# Patient Record
Sex: MALE | Race: WHITE | NOT HISPANIC OR LATINO | URBAN - METROPOLITAN AREA
[De-identification: names, ages, dates, MRNs, and addresses within clinical notes are randomized per-mention and may not be internally consistent; named-entity substitution may affect disease eponyms.]

---

## 2017-07-09 ENCOUNTER — SURGERY - HEALTHEAST (OUTPATIENT)
Dept: CARDIOLOGY | Facility: CLINIC | Age: 77
End: 2017-07-09

## 2017-07-09 ENCOUNTER — RECORDS - HEALTHEAST (OUTPATIENT)
Dept: ADMINISTRATIVE | Facility: OTHER | Age: 77
End: 2017-07-09

## 2017-07-10 ENCOUNTER — RECORDS - HEALTHEAST (OUTPATIENT)
Dept: ADMINISTRATIVE | Facility: OTHER | Age: 77
End: 2017-07-10

## 2017-07-10 ASSESSMENT — MIFFLIN-ST. JEOR
SCORE: 1758.5
SCORE: 1755.5
SCORE: 1758.5
SCORE: 1755.5

## 2017-07-11 ENCOUNTER — RECORDS - HEALTHEAST (OUTPATIENT)
Dept: ADMINISTRATIVE | Facility: OTHER | Age: 77
End: 2017-07-11

## 2017-07-11 ENCOUNTER — SURGERY - HEALTHEAST (OUTPATIENT)
Dept: CARDIOLOGY | Facility: CLINIC | Age: 77
End: 2017-07-11

## 2017-07-11 ASSESSMENT — MIFFLIN-ST. JEOR
SCORE: 1715.99
SCORE: 1715.99

## 2017-07-12 ENCOUNTER — ANESTHESIA - HEALTHEAST (OUTPATIENT)
Dept: CARDIOLOGY | Facility: CLINIC | Age: 77
End: 2017-07-12

## 2017-07-12 ENCOUNTER — RECORDS - HEALTHEAST (OUTPATIENT)
Dept: ADMINISTRATIVE | Facility: OTHER | Age: 77
End: 2017-07-12

## 2017-07-12 ASSESSMENT — MIFFLIN-ST. JEOR
SCORE: 1723.25

## 2017-07-13 ENCOUNTER — RECORDS - HEALTHEAST (OUTPATIENT)
Dept: ADMINISTRATIVE | Facility: OTHER | Age: 77
End: 2017-07-13

## 2017-07-13 ENCOUNTER — SURGERY - HEALTHEAST (OUTPATIENT)
Dept: CARDIOLOGY | Facility: CLINIC | Age: 77
End: 2017-07-13

## 2017-07-13 ASSESSMENT — MIFFLIN-ST. JEOR
SCORE: 1684.24
SCORE: 1684.24

## 2017-07-14 ENCOUNTER — RECORDS - HEALTHEAST (OUTPATIENT)
Dept: ADMINISTRATIVE | Facility: OTHER | Age: 77
End: 2017-07-14

## 2017-07-14 ASSESSMENT — MIFFLIN-ST. JEOR
SCORE: 1743.5
SCORE: 1703.5
SCORE: 1743.5
SCORE: 1743.5
SCORE: 1703.5
SCORE: 1743.5

## 2017-07-15 ENCOUNTER — RECORDS - HEALTHEAST (OUTPATIENT)
Dept: ADMINISTRATIVE | Facility: OTHER | Age: 77
End: 2017-07-15

## 2017-07-15 ASSESSMENT — MIFFLIN-ST. JEOR
SCORE: 1703.5
SCORE: 1703.5

## 2017-07-16 ENCOUNTER — RECORDS - HEALTHEAST (OUTPATIENT)
Dept: ADMINISTRATIVE | Facility: OTHER | Age: 77
End: 2017-07-16

## 2017-07-16 ASSESSMENT — MIFFLIN-ST. JEOR
SCORE: 1688.77
SCORE: 1688.77

## 2017-07-17 ENCOUNTER — AMBULATORY - HEALTHEAST (OUTPATIENT)
Dept: GERIATRICS | Facility: CLINIC | Age: 77
End: 2017-07-17

## 2017-07-18 ENCOUNTER — OFFICE VISIT - HEALTHEAST (OUTPATIENT)
Dept: GERIATRICS | Facility: CLINIC | Age: 77
End: 2017-07-18

## 2017-07-18 DIAGNOSIS — S81.809A OPEN WOUNDS INVOLVING MULTIPLE REGIONS OF LOWER EXTREMITY: ICD-10-CM

## 2017-07-18 DIAGNOSIS — I49.5 SINUS NODE DYSFUNCTION (H): ICD-10-CM

## 2017-07-18 DIAGNOSIS — I48.20 CHRONIC ATRIAL FIBRILLATION (H): ICD-10-CM

## 2017-07-18 DIAGNOSIS — R00.1 BRADYCARDIA: ICD-10-CM

## 2017-07-18 DIAGNOSIS — N18.6 ESRD (END STAGE RENAL DISEASE) (H): ICD-10-CM

## 2017-07-20 ENCOUNTER — OFFICE VISIT - HEALTHEAST (OUTPATIENT)
Dept: GERIATRICS | Facility: CLINIC | Age: 77
End: 2017-07-20

## 2017-07-20 DIAGNOSIS — S81.809A OPEN WOUNDS INVOLVING MULTIPLE REGIONS OF LOWER EXTREMITY: ICD-10-CM

## 2017-07-20 DIAGNOSIS — I48.20 CHRONIC ATRIAL FIBRILLATION (H): ICD-10-CM

## 2017-07-20 DIAGNOSIS — R00.1 BRADYCARDIA: ICD-10-CM

## 2017-07-20 DIAGNOSIS — I49.5 SINUS NODE DYSFUNCTION (H): ICD-10-CM

## 2017-07-20 DIAGNOSIS — N18.6 ESRD (END STAGE RENAL DISEASE) (H): ICD-10-CM

## 2017-07-25 ENCOUNTER — OFFICE VISIT - HEALTHEAST (OUTPATIENT)
Dept: GERIATRICS | Facility: CLINIC | Age: 77
End: 2017-07-25

## 2017-07-25 DIAGNOSIS — R00.1 BRADYCARDIA: ICD-10-CM

## 2017-07-25 DIAGNOSIS — S81.809A OPEN WOUNDS INVOLVING MULTIPLE REGIONS OF LOWER EXTREMITY: ICD-10-CM

## 2017-07-25 DIAGNOSIS — N18.6 ESRD (END STAGE RENAL DISEASE) (H): ICD-10-CM

## 2017-07-25 DIAGNOSIS — I49.5 SINUS NODE DYSFUNCTION (H): ICD-10-CM

## 2017-07-27 ENCOUNTER — OFFICE VISIT - HEALTHEAST (OUTPATIENT)
Dept: GERIATRICS | Facility: CLINIC | Age: 77
End: 2017-07-27

## 2017-07-27 DIAGNOSIS — S81.809A OPEN WOUNDS INVOLVING MULTIPLE REGIONS OF LOWER EXTREMITY: ICD-10-CM

## 2017-07-27 DIAGNOSIS — N18.6 ESRD (END STAGE RENAL DISEASE) (H): ICD-10-CM

## 2017-07-27 DIAGNOSIS — I49.5 SINUS NODE DYSFUNCTION (H): ICD-10-CM

## 2017-07-27 DIAGNOSIS — R00.1 BRADYCARDIA: ICD-10-CM

## 2017-08-01 ENCOUNTER — OFFICE VISIT - HEALTHEAST (OUTPATIENT)
Dept: GERIATRICS | Facility: CLINIC | Age: 77
End: 2017-08-01

## 2017-08-01 DIAGNOSIS — I10 ESSENTIAL HYPERTENSION WITH GOAL BLOOD PRESSURE LESS THAN 140/90: ICD-10-CM

## 2017-08-01 DIAGNOSIS — E78.5 HYPERLIPIDEMIA, UNSPECIFIED HYPERLIPIDEMIA TYPE: ICD-10-CM

## 2017-08-01 DIAGNOSIS — N04.9 NEPHROTIC SYNDROME: ICD-10-CM

## 2017-08-01 DIAGNOSIS — S81.809A OPEN WOUNDS INVOLVING MULTIPLE REGIONS OF LOWER EXTREMITY: ICD-10-CM

## 2017-08-03 ENCOUNTER — OFFICE VISIT - HEALTHEAST (OUTPATIENT)
Dept: GERIATRICS | Facility: CLINIC | Age: 77
End: 2017-08-03

## 2017-08-03 DIAGNOSIS — N18.6 ESRD (END STAGE RENAL DISEASE) (H): ICD-10-CM

## 2017-08-03 DIAGNOSIS — I10 ESSENTIAL HYPERTENSION WITH GOAL BLOOD PRESSURE LESS THAN 140/90: ICD-10-CM

## 2017-08-03 DIAGNOSIS — I48.20 CHRONIC ATRIAL FIBRILLATION (H): ICD-10-CM

## 2017-08-04 ENCOUNTER — OFFICE VISIT - HEALTHEAST (OUTPATIENT)
Dept: GERIATRICS | Facility: CLINIC | Age: 77
End: 2017-08-04

## 2017-08-04 DIAGNOSIS — R00.1 BRADYCARDIA: ICD-10-CM

## 2017-08-04 DIAGNOSIS — N18.6 ESRD (END STAGE RENAL DISEASE) (H): ICD-10-CM

## 2017-08-04 DIAGNOSIS — I10 ESSENTIAL HYPERTENSION WITH GOAL BLOOD PRESSURE LESS THAN 140/90: ICD-10-CM

## 2017-08-04 DIAGNOSIS — S81.809A OPEN WOUNDS INVOLVING MULTIPLE REGIONS OF LOWER EXTREMITY: ICD-10-CM

## 2017-08-09 ENCOUNTER — AMBULATORY - HEALTHEAST (OUTPATIENT)
Dept: GERIATRICS | Facility: CLINIC | Age: 77
End: 2017-08-09

## 2017-08-15 ENCOUNTER — AMBULATORY - HEALTHEAST (OUTPATIENT)
Dept: CARDIOLOGY | Facility: CLINIC | Age: 77
End: 2017-08-15

## 2017-08-15 DIAGNOSIS — Z95.0 CARDIAC PACEMAKER IN SITU: ICD-10-CM

## 2017-08-15 ASSESSMENT — MIFFLIN-ST. JEOR: SCORE: 1709.64

## 2017-08-16 LAB — HCC DEVICE COMMENTS: NORMAL

## 2017-11-30 ENCOUNTER — AMBULATORY - HEALTHEAST (OUTPATIENT)
Dept: CARDIOLOGY | Facility: CLINIC | Age: 77
End: 2017-11-30

## 2017-11-30 DIAGNOSIS — Z95.0 CARDIAC PACEMAKER IN SITU: ICD-10-CM

## 2017-11-30 LAB — HCC DEVICE COMMENTS: NORMAL

## 2017-11-30 ASSESSMENT — MIFFLIN-ST. JEOR: SCORE: 1669.27

## 2018-03-05 ENCOUNTER — AMBULATORY - HEALTHEAST (OUTPATIENT)
Dept: CARDIOLOGY | Facility: CLINIC | Age: 78
End: 2018-03-05

## 2018-03-05 DIAGNOSIS — Z95.0 CARDIAC PACEMAKER IN SITU: ICD-10-CM

## 2018-03-06 LAB — HCC DEVICE COMMENTS: NORMAL

## 2018-08-31 ENCOUNTER — RECORDS - HEALTHEAST (OUTPATIENT)
Dept: LAB | Facility: CLINIC | Age: 78
End: 2018-08-31

## 2018-08-31 LAB — INR PPP: 3.04 (ref 0.9–1.1)

## 2018-09-07 ENCOUNTER — RECORDS - HEALTHEAST (OUTPATIENT)
Dept: LAB | Facility: CLINIC | Age: 78
End: 2018-09-07

## 2018-09-07 LAB — INR PPP: 2.07 (ref 0.9–1.1)

## 2018-09-17 ENCOUNTER — RECORDS - HEALTHEAST (OUTPATIENT)
Dept: LAB | Facility: CLINIC | Age: 78
End: 2018-09-17

## 2018-09-17 LAB — INR PPP: 2.45 (ref 0.9–1.1)

## 2018-09-24 ENCOUNTER — RECORDS - HEALTHEAST (OUTPATIENT)
Dept: LAB | Facility: CLINIC | Age: 78
End: 2018-09-24

## 2018-09-24 LAB — INR PPP: 2.68 (ref 0.9–1.1)

## 2018-10-08 ENCOUNTER — RECORDS - HEALTHEAST (OUTPATIENT)
Dept: LAB | Facility: CLINIC | Age: 78
End: 2018-10-08

## 2018-10-08 LAB — INR PPP: 2.44 (ref 0.9–1.1)

## 2018-10-15 ENCOUNTER — RECORDS - HEALTHEAST (OUTPATIENT)
Dept: LAB | Facility: CLINIC | Age: 78
End: 2018-10-15

## 2018-10-15 LAB — INR PPP: 2.49 (ref 0.9–1.1)

## 2018-10-22 ENCOUNTER — RECORDS - HEALTHEAST (OUTPATIENT)
Dept: LAB | Facility: CLINIC | Age: 78
End: 2018-10-22

## 2018-10-22 LAB — INR PPP: 1.65 (ref 0.9–1.1)

## 2018-10-29 ENCOUNTER — RECORDS - HEALTHEAST (OUTPATIENT)
Dept: LAB | Facility: CLINIC | Age: 78
End: 2018-10-29

## 2018-10-29 LAB — INR PPP: 2.56 (ref 0.9–1.1)

## 2018-11-05 ENCOUNTER — RECORDS - HEALTHEAST (OUTPATIENT)
Dept: LAB | Facility: CLINIC | Age: 78
End: 2018-11-05

## 2018-11-05 LAB — INR PPP: 3.11 (ref 0.9–1.1)

## 2018-11-12 ENCOUNTER — RECORDS - HEALTHEAST (OUTPATIENT)
Dept: LAB | Facility: CLINIC | Age: 78
End: 2018-11-12

## 2018-11-12 LAB — INR PPP: 2.16 (ref 0.9–1.1)

## 2018-11-19 ENCOUNTER — RECORDS - HEALTHEAST (OUTPATIENT)
Dept: LAB | Facility: CLINIC | Age: 78
End: 2018-11-19

## 2018-11-19 LAB — INR PPP: 1.96 (ref 0.9–1.1)

## 2018-11-26 ENCOUNTER — RECORDS - HEALTHEAST (OUTPATIENT)
Dept: LAB | Facility: CLINIC | Age: 78
End: 2018-11-26

## 2018-11-26 LAB — INR PPP: 2.02 (ref 0.9–1.1)

## 2018-12-03 ENCOUNTER — RECORDS - HEALTHEAST (OUTPATIENT)
Dept: LAB | Facility: CLINIC | Age: 78
End: 2018-12-03

## 2018-12-03 LAB — INR PPP: 1.81 (ref 0.9–1.1)

## 2018-12-10 ENCOUNTER — RECORDS - HEALTHEAST (OUTPATIENT)
Dept: LAB | Facility: CLINIC | Age: 78
End: 2018-12-10

## 2018-12-10 LAB — INR PPP: 1.36 (ref 0.9–1.1)

## 2018-12-17 ENCOUNTER — RECORDS - HEALTHEAST (OUTPATIENT)
Dept: LAB | Facility: CLINIC | Age: 78
End: 2018-12-17

## 2018-12-17 LAB — INR PPP: 1.77 (ref 0.9–1.1)

## 2018-12-26 ENCOUNTER — RECORDS - HEALTHEAST (OUTPATIENT)
Dept: LAB | Facility: CLINIC | Age: 78
End: 2018-12-26

## 2018-12-26 LAB — INR PPP: 1.95 (ref 0.9–1.1)

## 2018-12-31 ENCOUNTER — RECORDS - HEALTHEAST (OUTPATIENT)
Dept: LAB | Facility: CLINIC | Age: 78
End: 2018-12-31

## 2018-12-31 LAB — INR PPP: 2.27 (ref 0.9–1.1)

## 2019-01-07 ENCOUNTER — RECORDS - HEALTHEAST (OUTPATIENT)
Dept: LAB | Facility: CLINIC | Age: 79
End: 2019-01-07

## 2019-01-07 LAB — INR PPP: 2.49 (ref 0.9–1.1)

## 2019-01-14 ENCOUNTER — RECORDS - HEALTHEAST (OUTPATIENT)
Dept: LAB | Facility: CLINIC | Age: 79
End: 2019-01-14

## 2019-01-14 LAB — INR PPP: 2.32 (ref 0.9–1.1)

## 2019-01-21 ENCOUNTER — RECORDS - HEALTHEAST (OUTPATIENT)
Dept: LAB | Facility: CLINIC | Age: 79
End: 2019-01-21

## 2019-01-21 LAB — INR PPP: 2.34 (ref 0.9–1.1)

## 2019-01-28 ENCOUNTER — RECORDS - HEALTHEAST (OUTPATIENT)
Dept: LAB | Facility: CLINIC | Age: 79
End: 2019-01-28

## 2019-01-28 LAB — INR PPP: 2.61 (ref 0.9–1.1)

## 2019-02-04 ENCOUNTER — RECORDS - HEALTHEAST (OUTPATIENT)
Dept: LAB | Facility: CLINIC | Age: 79
End: 2019-02-04

## 2019-02-04 LAB — INR PPP: 2.62 (ref 0.9–1.1)

## 2019-02-11 ENCOUNTER — RECORDS - HEALTHEAST (OUTPATIENT)
Dept: LAB | Facility: CLINIC | Age: 79
End: 2019-02-11

## 2019-02-11 LAB — INR PPP: 1.98 (ref 0.9–1.1)

## 2019-02-20 ENCOUNTER — RECORDS - HEALTHEAST (OUTPATIENT)
Dept: LAB | Facility: CLINIC | Age: 79
End: 2019-02-20

## 2019-02-20 LAB — INR PPP: 1.63 (ref 0.9–1.1)

## 2019-02-25 ENCOUNTER — RECORDS - HEALTHEAST (OUTPATIENT)
Dept: LAB | Facility: CLINIC | Age: 79
End: 2019-02-25

## 2019-02-25 LAB — INR PPP: 2 (ref 0.9–1.1)

## 2019-03-04 ENCOUNTER — RECORDS - HEALTHEAST (OUTPATIENT)
Dept: LAB | Facility: CLINIC | Age: 79
End: 2019-03-04

## 2019-03-04 LAB — INR PPP: 1.9 (ref 0.9–1.1)

## 2019-03-13 ENCOUNTER — RECORDS - HEALTHEAST (OUTPATIENT)
Dept: LAB | Facility: CLINIC | Age: 79
End: 2019-03-13

## 2019-03-13 LAB — INR PPP: 3.06 (ref 0.9–1.1)

## 2019-03-18 ENCOUNTER — RECORDS - HEALTHEAST (OUTPATIENT)
Dept: LAB | Facility: CLINIC | Age: 79
End: 2019-03-18

## 2019-03-18 LAB — INR PPP: 2.07 (ref 0.9–1.1)

## 2019-03-25 ENCOUNTER — RECORDS - HEALTHEAST (OUTPATIENT)
Dept: LAB | Facility: CLINIC | Age: 79
End: 2019-03-25

## 2019-03-25 LAB — INR PPP: 2.95 (ref 0.9–1.1)

## 2019-04-01 ENCOUNTER — RECORDS - HEALTHEAST (OUTPATIENT)
Dept: LAB | Facility: CLINIC | Age: 79
End: 2019-04-01

## 2019-04-01 LAB — INR PPP: 2.66 (ref 0.9–1.1)

## 2019-04-08 ENCOUNTER — RECORDS - HEALTHEAST (OUTPATIENT)
Dept: LAB | Facility: CLINIC | Age: 79
End: 2019-04-08

## 2019-04-08 LAB — INR PPP: 2.07 (ref 0.9–1.1)

## 2019-04-15 ENCOUNTER — RECORDS - HEALTHEAST (OUTPATIENT)
Dept: LAB | Facility: CLINIC | Age: 79
End: 2019-04-15

## 2019-04-15 LAB — INR PPP: 3.13 (ref 0.9–1.1)

## 2019-04-22 ENCOUNTER — RECORDS - HEALTHEAST (OUTPATIENT)
Dept: LAB | Facility: CLINIC | Age: 79
End: 2019-04-22

## 2019-04-22 LAB — INR PPP: 2.41 (ref 0.9–1.1)

## 2019-04-29 ENCOUNTER — RECORDS - HEALTHEAST (OUTPATIENT)
Dept: LAB | Facility: CLINIC | Age: 79
End: 2019-04-29

## 2019-04-29 LAB — INR PPP: 1.25 (ref 0.9–1.1)

## 2019-05-06 ENCOUNTER — RECORDS - HEALTHEAST (OUTPATIENT)
Dept: LAB | Facility: CLINIC | Age: 79
End: 2019-05-06

## 2019-05-06 LAB — INR PPP: 1.6 (ref 0.9–1.1)

## 2019-05-13 ENCOUNTER — RECORDS - HEALTHEAST (OUTPATIENT)
Dept: LAB | Facility: CLINIC | Age: 79
End: 2019-05-13

## 2019-05-13 LAB — INR PPP: 1.77 (ref 0.9–1.1)

## 2019-05-20 ENCOUNTER — RECORDS - HEALTHEAST (OUTPATIENT)
Dept: LAB | Facility: CLINIC | Age: 79
End: 2019-05-20

## 2019-05-20 LAB — INR PPP: 2.03 (ref 0.9–1.1)

## 2019-05-29 ENCOUNTER — RECORDS - HEALTHEAST (OUTPATIENT)
Dept: LAB | Facility: CLINIC | Age: 79
End: 2019-05-29

## 2019-05-29 LAB — INR PPP: 2.25 (ref 0.9–1.1)

## 2019-06-03 ENCOUNTER — RECORDS - HEALTHEAST (OUTPATIENT)
Dept: LAB | Facility: CLINIC | Age: 79
End: 2019-06-03

## 2019-06-03 LAB — INR PPP: 1.66 (ref 0.9–1.1)

## 2019-06-10 ENCOUNTER — RECORDS - HEALTHEAST (OUTPATIENT)
Dept: LAB | Facility: CLINIC | Age: 79
End: 2019-06-10

## 2019-06-10 LAB — INR PPP: 1.85 (ref 0.9–1.1)

## 2019-06-17 ENCOUNTER — RECORDS - HEALTHEAST (OUTPATIENT)
Dept: LAB | Facility: CLINIC | Age: 79
End: 2019-06-17

## 2019-06-17 LAB — INR PPP: 2.08 (ref 0.9–1.1)

## 2019-07-01 ENCOUNTER — RECORDS - HEALTHEAST (OUTPATIENT)
Dept: LAB | Facility: CLINIC | Age: 79
End: 2019-07-01

## 2019-07-01 LAB — INR PPP: 2.89 (ref 0.9–1.1)

## 2019-07-08 ENCOUNTER — RECORDS - HEALTHEAST (OUTPATIENT)
Dept: LAB | Facility: CLINIC | Age: 79
End: 2019-07-08

## 2019-07-08 LAB — INR PPP: 2.5 (ref 0.9–1.1)

## 2019-07-15 ENCOUNTER — RECORDS - HEALTHEAST (OUTPATIENT)
Dept: LAB | Facility: CLINIC | Age: 79
End: 2019-07-15

## 2019-07-15 LAB — INR PPP: 1.42 (ref 0.9–1.1)

## 2019-07-29 ENCOUNTER — RECORDS - HEALTHEAST (OUTPATIENT)
Dept: LAB | Facility: CLINIC | Age: 79
End: 2019-07-29

## 2019-07-29 LAB — INR PPP: 1.57 (ref 0.9–1.1)

## 2019-08-05 ENCOUNTER — RECORDS - HEALTHEAST (OUTPATIENT)
Dept: LAB | Facility: CLINIC | Age: 79
End: 2019-08-05

## 2019-08-05 LAB — INR PPP: 2 (ref 0.9–1.1)

## 2019-08-12 ENCOUNTER — RECORDS - HEALTHEAST (OUTPATIENT)
Dept: LAB | Facility: CLINIC | Age: 79
End: 2019-08-12

## 2019-08-12 LAB — INR PPP: 2.01 (ref 0.9–1.1)

## 2019-08-19 ENCOUNTER — RECORDS - HEALTHEAST (OUTPATIENT)
Dept: LAB | Facility: CLINIC | Age: 79
End: 2019-08-19

## 2019-08-19 LAB — INR PPP: 3.26 (ref 0.9–1.1)

## 2019-08-26 ENCOUNTER — RECORDS - HEALTHEAST (OUTPATIENT)
Dept: LAB | Facility: CLINIC | Age: 79
End: 2019-08-26

## 2019-08-26 LAB — INR PPP: 2.39 (ref 0.9–1.1)

## 2019-09-09 ENCOUNTER — RECORDS - HEALTHEAST (OUTPATIENT)
Dept: LAB | Facility: CLINIC | Age: 79
End: 2019-09-09

## 2019-09-09 LAB — INR PPP: 2.76 (ref 0.9–1.1)

## 2019-09-16 ENCOUNTER — RECORDS - HEALTHEAST (OUTPATIENT)
Dept: LAB | Facility: CLINIC | Age: 79
End: 2019-09-16

## 2019-09-16 LAB — INR PPP: 1.55 (ref 0.9–1.1)

## 2019-09-23 ENCOUNTER — RECORDS - HEALTHEAST (OUTPATIENT)
Dept: LAB | Facility: CLINIC | Age: 79
End: 2019-09-23

## 2019-09-23 LAB — INR PPP: 2.64 (ref 0.9–1.1)

## 2019-09-30 ENCOUNTER — RECORDS - HEALTHEAST (OUTPATIENT)
Dept: LAB | Facility: CLINIC | Age: 79
End: 2019-09-30

## 2019-09-30 LAB — INR PPP: 2.8 (ref 0.9–1.1)

## 2019-10-14 ENCOUNTER — RECORDS - HEALTHEAST (OUTPATIENT)
Dept: LAB | Facility: CLINIC | Age: 79
End: 2019-10-14

## 2019-10-14 LAB — INR PPP: 3.2 (ref 0.9–1.1)

## 2019-10-23 ENCOUNTER — RECORDS - HEALTHEAST (OUTPATIENT)
Dept: LAB | Facility: CLINIC | Age: 79
End: 2019-10-23

## 2019-10-23 LAB — INR PPP: 2.38 (ref 0.9–1.1)

## 2019-10-28 ENCOUNTER — RECORDS - HEALTHEAST (OUTPATIENT)
Dept: LAB | Facility: CLINIC | Age: 79
End: 2019-10-28

## 2019-10-28 LAB — INR PPP: 2.64 (ref 0.9–1.1)

## 2019-11-04 ENCOUNTER — RECORDS - HEALTHEAST (OUTPATIENT)
Dept: LAB | Facility: CLINIC | Age: 79
End: 2019-11-04

## 2019-11-04 LAB — INR PPP: 2.61 (ref 0.9–1.1)

## 2019-11-11 ENCOUNTER — RECORDS - HEALTHEAST (OUTPATIENT)
Dept: LAB | Facility: CLINIC | Age: 79
End: 2019-11-11

## 2019-11-11 LAB — INR PPP: 2.46 (ref 0.9–1.1)

## 2019-11-18 ENCOUNTER — RECORDS - HEALTHEAST (OUTPATIENT)
Dept: LAB | Facility: CLINIC | Age: 79
End: 2019-11-18

## 2019-11-18 LAB — INR PPP: 2.95 (ref 0.9–1.1)

## 2019-11-25 ENCOUNTER — RECORDS - HEALTHEAST (OUTPATIENT)
Dept: LAB | Facility: CLINIC | Age: 79
End: 2019-11-25

## 2019-11-25 LAB — INR PPP: 2.51 (ref 0.9–1.1)

## 2019-12-02 ENCOUNTER — RECORDS - HEALTHEAST (OUTPATIENT)
Dept: LAB | Facility: CLINIC | Age: 79
End: 2019-12-02

## 2019-12-02 LAB — INR PPP: 2.35 (ref 0.9–1.1)

## 2019-12-09 ENCOUNTER — RECORDS - HEALTHEAST (OUTPATIENT)
Dept: LAB | Facility: CLINIC | Age: 79
End: 2019-12-09

## 2019-12-09 LAB — INR PPP: 2.13 (ref 0.9–1.1)

## 2019-12-16 ENCOUNTER — RECORDS - HEALTHEAST (OUTPATIENT)
Dept: LAB | Facility: CLINIC | Age: 79
End: 2019-12-16

## 2019-12-16 LAB — INR PPP: 2.76 (ref 0.9–1.1)

## 2019-12-23 ENCOUNTER — RECORDS - HEALTHEAST (OUTPATIENT)
Dept: LAB | Facility: CLINIC | Age: 79
End: 2019-12-23

## 2019-12-23 LAB — INR PPP: 2.09 (ref 0.9–1.1)

## 2019-12-30 ENCOUNTER — RECORDS - HEALTHEAST (OUTPATIENT)
Dept: LAB | Facility: CLINIC | Age: 79
End: 2019-12-30

## 2019-12-30 LAB — INR PPP: 2.64 (ref 0.9–1.1)

## 2020-01-08 ENCOUNTER — RECORDS - HEALTHEAST (OUTPATIENT)
Dept: LAB | Facility: CLINIC | Age: 80
End: 2020-01-08

## 2020-01-08 LAB — INR PPP: 2.72 (ref 0.9–1.1)

## 2020-01-13 ENCOUNTER — RECORDS - HEALTHEAST (OUTPATIENT)
Dept: LAB | Facility: CLINIC | Age: 80
End: 2020-01-13

## 2020-01-13 LAB — INR PPP: 2.24 (ref 0.9–1.1)

## 2020-01-20 ENCOUNTER — RECORDS - HEALTHEAST (OUTPATIENT)
Dept: LAB | Facility: CLINIC | Age: 80
End: 2020-01-20

## 2020-01-20 LAB — INR PPP: 2.05 (ref 0.9–1.1)

## 2020-01-27 ENCOUNTER — RECORDS - HEALTHEAST (OUTPATIENT)
Dept: LAB | Facility: CLINIC | Age: 80
End: 2020-01-27

## 2020-01-27 LAB — INR PPP: 4.74 (ref 0.9–1.1)

## 2020-02-03 ENCOUNTER — RECORDS - HEALTHEAST (OUTPATIENT)
Dept: LAB | Facility: CLINIC | Age: 80
End: 2020-02-03

## 2020-02-03 LAB — INR PPP: 3.15 (ref 0.9–1.1)

## 2020-02-10 ENCOUNTER — RECORDS - HEALTHEAST (OUTPATIENT)
Dept: LAB | Facility: CLINIC | Age: 80
End: 2020-02-10

## 2020-02-10 LAB — INR PPP: 2.09 (ref 0.9–1.1)

## 2020-02-17 ENCOUNTER — RECORDS - HEALTHEAST (OUTPATIENT)
Dept: LAB | Facility: CLINIC | Age: 80
End: 2020-02-17

## 2020-02-17 LAB — INR PPP: 2.12 (ref 0.9–1.1)

## 2020-02-24 ENCOUNTER — RECORDS - HEALTHEAST (OUTPATIENT)
Dept: LAB | Facility: CLINIC | Age: 80
End: 2020-02-24

## 2020-02-24 LAB — INR PPP: 4.1 (ref 0.9–1.1)

## 2020-03-02 ENCOUNTER — RECORDS - HEALTHEAST (OUTPATIENT)
Dept: LAB | Facility: CLINIC | Age: 80
End: 2020-03-02

## 2020-03-02 LAB — INR PPP: 3.33 (ref 0.9–1.1)

## 2020-03-09 ENCOUNTER — RECORDS - HEALTHEAST (OUTPATIENT)
Dept: LAB | Facility: CLINIC | Age: 80
End: 2020-03-09

## 2020-03-09 LAB — INR PPP: 2.27 (ref 0.9–1.1)

## 2020-03-16 ENCOUNTER — RECORDS - HEALTHEAST (OUTPATIENT)
Dept: LAB | Facility: CLINIC | Age: 80
End: 2020-03-16

## 2020-03-16 LAB — INR PPP: 2.33 (ref 0.9–1.1)

## 2020-03-23 ENCOUNTER — RECORDS - HEALTHEAST (OUTPATIENT)
Dept: LAB | Facility: CLINIC | Age: 80
End: 2020-03-23

## 2020-03-23 LAB — INR PPP: 2.8 (ref 0.9–1.1)

## 2020-03-30 ENCOUNTER — RECORDS - HEALTHEAST (OUTPATIENT)
Dept: LAB | Facility: CLINIC | Age: 80
End: 2020-03-30

## 2020-03-30 LAB — INR PPP: 3.96 (ref 0.9–1.1)

## 2020-04-06 ENCOUNTER — RECORDS - HEALTHEAST (OUTPATIENT)
Dept: LAB | Facility: CLINIC | Age: 80
End: 2020-04-06

## 2020-04-06 LAB — INR PPP: 3.6 (ref 0.9–1.1)

## 2020-04-13 ENCOUNTER — RECORDS - HEALTHEAST (OUTPATIENT)
Dept: LAB | Facility: CLINIC | Age: 80
End: 2020-04-13

## 2020-04-13 LAB — INR PPP: 2.76 (ref 0.9–1.1)

## 2020-04-20 ENCOUNTER — RECORDS - HEALTHEAST (OUTPATIENT)
Dept: LAB | Facility: CLINIC | Age: 80
End: 2020-04-20

## 2020-04-20 LAB — INR PPP: 3.83 (ref 0.9–1.1)

## 2020-04-27 ENCOUNTER — RECORDS - HEALTHEAST (OUTPATIENT)
Dept: LAB | Facility: CLINIC | Age: 80
End: 2020-04-27

## 2020-04-27 LAB — INR PPP: 2.67 (ref 0.9–1.1)

## 2020-05-04 ENCOUNTER — RECORDS - HEALTHEAST (OUTPATIENT)
Dept: LAB | Facility: CLINIC | Age: 80
End: 2020-05-04

## 2020-05-04 LAB — INR PPP: 3.11 (ref 0.9–1.1)

## 2020-05-11 ENCOUNTER — RECORDS - HEALTHEAST (OUTPATIENT)
Dept: LAB | Facility: CLINIC | Age: 80
End: 2020-05-11

## 2020-05-11 LAB — INR PPP: 2.53 (ref 0.9–1.1)

## 2020-05-18 ENCOUNTER — RECORDS - HEALTHEAST (OUTPATIENT)
Dept: LAB | Facility: CLINIC | Age: 80
End: 2020-05-18

## 2020-05-18 LAB — INR PPP: 3.33 (ref 0.9–1.1)

## 2020-05-25 ENCOUNTER — RECORDS - HEALTHEAST (OUTPATIENT)
Dept: LAB | Facility: CLINIC | Age: 80
End: 2020-05-25

## 2020-05-25 LAB — INR PPP: 1.98 (ref 0.9–1.1)

## 2020-06-01 ENCOUNTER — RECORDS - HEALTHEAST (OUTPATIENT)
Dept: LAB | Facility: CLINIC | Age: 80
End: 2020-06-01

## 2020-06-01 LAB — INR PPP: 3 (ref 0.9–1.1)

## 2020-06-08 ENCOUNTER — RECORDS - HEALTHEAST (OUTPATIENT)
Dept: LAB | Facility: CLINIC | Age: 80
End: 2020-06-08

## 2020-06-08 LAB — INR PPP: 2.52 (ref 0.9–1.1)

## 2020-06-15 ENCOUNTER — RECORDS - HEALTHEAST (OUTPATIENT)
Dept: LAB | Facility: CLINIC | Age: 80
End: 2020-06-15

## 2020-06-15 LAB — INR PPP: 2.09 (ref 0.9–1.1)

## 2020-06-22 ENCOUNTER — RECORDS - HEALTHEAST (OUTPATIENT)
Dept: LAB | Facility: CLINIC | Age: 80
End: 2020-06-22

## 2020-06-22 LAB — INR PPP: 2.3 (ref 0.9–1.1)

## 2020-06-29 ENCOUNTER — RECORDS - HEALTHEAST (OUTPATIENT)
Dept: LAB | Facility: CLINIC | Age: 80
End: 2020-06-29

## 2020-06-29 LAB — INR PPP: 2.5 (ref 0.9–1.1)

## 2020-07-06 ENCOUNTER — RECORDS - HEALTHEAST (OUTPATIENT)
Dept: LAB | Facility: CLINIC | Age: 80
End: 2020-07-06

## 2020-07-06 LAB — INR PPP: 2.92 (ref 0.9–1.1)

## 2020-07-13 ENCOUNTER — RECORDS - HEALTHEAST (OUTPATIENT)
Dept: LAB | Facility: CLINIC | Age: 80
End: 2020-07-13

## 2020-07-13 LAB — INR PPP: 2.86 (ref 0.9–1.1)

## 2020-07-20 ENCOUNTER — RECORDS - HEALTHEAST (OUTPATIENT)
Dept: LAB | Facility: CLINIC | Age: 80
End: 2020-07-20

## 2020-07-20 LAB — INR PPP: 2.4 (ref 0.9–1.1)

## 2020-07-27 ENCOUNTER — RECORDS - HEALTHEAST (OUTPATIENT)
Dept: LAB | Facility: CLINIC | Age: 80
End: 2020-07-27

## 2020-07-27 LAB — INR PPP: 2.7 (ref 0.9–1.1)

## 2020-08-03 ENCOUNTER — RECORDS - HEALTHEAST (OUTPATIENT)
Dept: LAB | Facility: CLINIC | Age: 80
End: 2020-08-03

## 2020-08-03 LAB — INR PPP: 2.12 (ref 0.9–1.1)

## 2020-08-10 ENCOUNTER — RECORDS - HEALTHEAST (OUTPATIENT)
Dept: LAB | Facility: CLINIC | Age: 80
End: 2020-08-10

## 2020-08-10 LAB — INR PPP: 2.93 (ref 0.9–1.1)

## 2020-08-17 ENCOUNTER — RECORDS - HEALTHEAST (OUTPATIENT)
Dept: LAB | Facility: CLINIC | Age: 80
End: 2020-08-17

## 2020-08-17 LAB — INR PPP: 3.21 (ref 0.9–1.1)

## 2020-08-24 ENCOUNTER — RECORDS - HEALTHEAST (OUTPATIENT)
Dept: LAB | Facility: CLINIC | Age: 80
End: 2020-08-24

## 2020-08-24 LAB — INR PPP: 3.26 (ref 0.9–1.1)

## 2020-08-31 ENCOUNTER — RECORDS - HEALTHEAST (OUTPATIENT)
Dept: LAB | Facility: CLINIC | Age: 80
End: 2020-08-31

## 2020-08-31 LAB — INR PPP: 2.05 (ref 0.9–1.1)

## 2020-09-07 ENCOUNTER — RECORDS - HEALTHEAST (OUTPATIENT)
Dept: LAB | Facility: CLINIC | Age: 80
End: 2020-09-07

## 2020-09-07 LAB — INR PPP: 2.57 (ref 0.9–1.1)

## 2020-09-14 ENCOUNTER — RECORDS - HEALTHEAST (OUTPATIENT)
Dept: LAB | Facility: CLINIC | Age: 80
End: 2020-09-14

## 2020-09-14 LAB — INR PPP: 2.95 (ref 0.9–1.1)

## 2020-09-21 ENCOUNTER — RECORDS - HEALTHEAST (OUTPATIENT)
Dept: LAB | Facility: CLINIC | Age: 80
End: 2020-09-21

## 2020-09-21 LAB — INR PPP: 4.17 (ref 0.9–1.1)

## 2020-09-28 ENCOUNTER — RECORDS - HEALTHEAST (OUTPATIENT)
Dept: LAB | Facility: CLINIC | Age: 80
End: 2020-09-28

## 2020-09-28 LAB — INR PPP: 2.98 (ref 0.9–1.1)

## 2020-10-05 ENCOUNTER — RECORDS - HEALTHEAST (OUTPATIENT)
Dept: LAB | Facility: CLINIC | Age: 80
End: 2020-10-05

## 2020-10-05 LAB — INR PPP: 3.18 (ref 0.9–1.1)

## 2020-10-12 ENCOUNTER — RECORDS - HEALTHEAST (OUTPATIENT)
Dept: LAB | Facility: CLINIC | Age: 80
End: 2020-10-12

## 2020-10-12 LAB — INR PPP: 2.7 (ref 0.9–1.1)

## 2020-10-19 ENCOUNTER — RECORDS - HEALTHEAST (OUTPATIENT)
Dept: LAB | Facility: CLINIC | Age: 80
End: 2020-10-19

## 2020-10-19 LAB — INR PPP: 1.96 (ref 0.9–1.1)

## 2020-10-26 ENCOUNTER — RECORDS - HEALTHEAST (OUTPATIENT)
Dept: LAB | Facility: CLINIC | Age: 80
End: 2020-10-26

## 2020-10-26 LAB — INR PPP: 3.23 (ref 0.9–1.1)

## 2020-11-02 ENCOUNTER — RECORDS - HEALTHEAST (OUTPATIENT)
Dept: LAB | Facility: CLINIC | Age: 80
End: 2020-11-02

## 2020-11-02 LAB — INR PPP: 1.52 (ref 0.9–1.1)

## 2020-11-09 ENCOUNTER — RECORDS - HEALTHEAST (OUTPATIENT)
Dept: LAB | Facility: CLINIC | Age: 80
End: 2020-11-09

## 2020-11-09 LAB — INR PPP: 3.16 (ref 0.9–1.1)

## 2020-11-16 ENCOUNTER — RECORDS - HEALTHEAST (OUTPATIENT)
Dept: LAB | Facility: CLINIC | Age: 80
End: 2020-11-16

## 2020-11-16 LAB — INR PPP: 1.77 (ref 0.9–1.1)

## 2020-11-23 ENCOUNTER — RECORDS - HEALTHEAST (OUTPATIENT)
Dept: LAB | Facility: CLINIC | Age: 80
End: 2020-11-23

## 2020-11-23 LAB — INR PPP: 2.21 (ref 0.9–1.1)

## 2020-11-30 ENCOUNTER — RECORDS - HEALTHEAST (OUTPATIENT)
Dept: LAB | Facility: CLINIC | Age: 80
End: 2020-11-30

## 2020-11-30 LAB — INR PPP: 2.75 (ref 0.9–1.1)

## 2020-12-07 ENCOUNTER — RECORDS - HEALTHEAST (OUTPATIENT)
Dept: LAB | Facility: CLINIC | Age: 80
End: 2020-12-07

## 2020-12-07 LAB — INR PPP: 3.09 (ref 0.9–1.1)

## 2020-12-14 ENCOUNTER — RECORDS - HEALTHEAST (OUTPATIENT)
Dept: LAB | Facility: CLINIC | Age: 80
End: 2020-12-14

## 2020-12-14 LAB — INR PPP: 2.58 (ref 0.9–1.1)

## 2020-12-21 ENCOUNTER — RECORDS - HEALTHEAST (OUTPATIENT)
Dept: LAB | Facility: CLINIC | Age: 80
End: 2020-12-21

## 2020-12-21 LAB — INR PPP: 2.72 (ref 0.9–1.1)

## 2020-12-28 ENCOUNTER — RECORDS - HEALTHEAST (OUTPATIENT)
Dept: LAB | Facility: CLINIC | Age: 80
End: 2020-12-28

## 2020-12-28 LAB — INR PPP: 2.48 (ref 0.9–1.1)

## 2021-01-11 ENCOUNTER — RECORDS - HEALTHEAST (OUTPATIENT)
Dept: LAB | Facility: CLINIC | Age: 81
End: 2021-01-11

## 2021-01-11 LAB — INR PPP: 3.16 (ref 0.9–1.1)

## 2021-01-18 ENCOUNTER — RECORDS - HEALTHEAST (OUTPATIENT)
Dept: LAB | Facility: CLINIC | Age: 81
End: 2021-01-18

## 2021-01-18 LAB — INR PPP: 4.21 (ref 0.9–1.1)

## 2021-01-25 ENCOUNTER — RECORDS - HEALTHEAST (OUTPATIENT)
Dept: LAB | Facility: CLINIC | Age: 81
End: 2021-01-25

## 2021-01-25 LAB — INR PPP: 3.19 (ref 0.9–1.1)

## 2021-02-01 ENCOUNTER — RECORDS - HEALTHEAST (OUTPATIENT)
Dept: LAB | Facility: CLINIC | Age: 81
End: 2021-02-01

## 2021-02-01 LAB — INR PPP: 2.63 (ref 0.9–1.1)

## 2021-02-10 ENCOUNTER — RECORDS - HEALTHEAST (OUTPATIENT)
Dept: LAB | Facility: CLINIC | Age: 81
End: 2021-02-10

## 2021-02-10 LAB — INR PPP: 4.21 (ref 0.9–1.1)

## 2021-02-15 ENCOUNTER — RECORDS - HEALTHEAST (OUTPATIENT)
Dept: LAB | Facility: CLINIC | Age: 81
End: 2021-02-15

## 2021-02-15 LAB — INR PPP: 3.51 (ref 0.9–1.1)

## 2021-02-22 ENCOUNTER — RECORDS - HEALTHEAST (OUTPATIENT)
Dept: LAB | Facility: CLINIC | Age: 81
End: 2021-02-22

## 2021-02-22 LAB — INR PPP: 3.47 (ref 0.9–1.1)

## 2021-03-01 ENCOUNTER — RECORDS - HEALTHEAST (OUTPATIENT)
Dept: LAB | Facility: CLINIC | Age: 81
End: 2021-03-01

## 2021-03-01 LAB — INR PPP: 4.46 (ref 0.9–1.1)

## 2021-03-02 ENCOUNTER — COMMUNICATION - HEALTHEAST (OUTPATIENT)
Dept: SCHEDULING | Facility: CLINIC | Age: 81
End: 2021-03-02

## 2021-05-31 VITALS
HEIGHT: 68 IN | HEIGHT: 68 IN | WEIGHT: 220.3 LBS | BODY MASS INDEX: 33.39 KG/M2 | WEIGHT: 220.3 LBS | BODY MASS INDEX: 33.39 KG/M2 | BODY MASS INDEX: 33.5 KG/M2 | WEIGHT: 220.3 LBS

## 2021-05-31 VITALS — BODY MASS INDEX: 34.18 KG/M2 | WEIGHT: 224.8 LBS

## 2021-05-31 VITALS
BODY MASS INDEX: 35.33 KG/M2 | WEIGHT: 232.37 LBS | WEIGHT: 223.55 LBS | BODY MASS INDEX: 35.33 KG/M2 | BODY MASS INDEX: 33.99 KG/M2 | WEIGHT: 232.37 LBS

## 2021-05-31 VITALS — WEIGHT: 224.9 LBS | HEIGHT: 68 IN | BODY MASS INDEX: 34.08 KG/M2

## 2021-05-31 VITALS — BODY MASS INDEX: 34.15 KG/M2 | WEIGHT: 224.6 LBS

## 2021-05-31 VITALS — HEIGHT: 68 IN | WEIGHT: 216 LBS | BODY MASS INDEX: 32.74 KG/M2

## 2021-05-31 VITALS — WEIGHT: 227.9 LBS | WEIGHT: 227.9 LBS | BODY MASS INDEX: 34.65 KG/M2 | BODY MASS INDEX: 34.65 KG/M2

## 2021-05-31 VITALS — WEIGHT: 223.55 LBS | BODY MASS INDEX: 33.99 KG/M2

## 2021-05-31 VITALS — WEIGHT: 222 LBS | BODY MASS INDEX: 33.75 KG/M2

## 2021-05-31 VITALS — WEIGHT: 219.3 LBS | BODY MASS INDEX: 33.34 KG/M2

## 2021-05-31 VITALS — WEIGHT: 226.3 LBS | BODY MASS INDEX: 35.73 KG/M2 | BODY MASS INDEX: 34.41 KG/M2 | HEIGHT: 68 IN

## 2021-05-31 VITALS — WEIGHT: 235.63 LBS

## 2021-05-31 VITALS — BODY MASS INDEX: 33.45 KG/M2 | WEIGHT: 220 LBS

## 2021-05-31 VITALS — WEIGHT: 223 LBS | BODY MASS INDEX: 33.91 KG/M2

## 2021-05-31 VITALS — WEIGHT: 235.01 LBS | BODY MASS INDEX: 35.83 KG/M2 | WEIGHT: 235.67 LBS | BODY MASS INDEX: 35.73 KG/M2

## 2021-05-31 VITALS — HEIGHT: 68 IN

## 2021-06-09 ENCOUNTER — RECORDS - HEALTHEAST (OUTPATIENT)
Dept: CARDIOLOGY | Facility: CLINIC | Age: 81
End: 2021-06-09

## 2021-06-11 NOTE — ANESTHESIA CARE TRANSFER NOTE
Last vitals:   Vitals:    07/13/17 1312   BP: 137/44   Pulse: 80   Resp: 16   Temp: 36.4  C (97.6  F)   SpO2: 99%     Patient's level of consciousness is drowsy  Spontaneous respirations: yes  Maintains airway independently: yes  Dentition unchanged: yes  Oropharynx: oropharynx clear of all foreign objects    QCDR Measures:  ASA# 20 - Surgical No Data Recorded  PQRS# 430 - Adult PONV Prevention: 4558F - Pt received => 2 anti-emetic agents (different classes) preop & intraop  ASA# 8 - Peds PONV Prevention: NA - Not pediatric patient, not GA or 2 or more risk factors NOT present  PQRS# 424 - Sanjana-op Temp Management: 4559F - At least one body temp DOCUMENTED => 35.5C or 95.9F within required timeframe  PQRS# 426 - PACU Transfer Protocol:NA - Patient did not go to PACU  ASA# 14 - Acute Post-op Pain: ASA14B - Patient did NOT experience pain >= 7 out of 10

## 2021-06-11 NOTE — ANESTHESIA PREPROCEDURE EVALUATION
Anesthesia Evaluation      Patient summary reviewed   No history of anesthetic complications     Airway   Mallampati: III  Neck ROM: limited   Pulmonary - negative ROS   (+) rhonchi,                          Cardiovascular   Exercise tolerance: < 4 METS  (+) hypertension, dysrhythmias, , hypercholesterolemia,     ECG reviewed  Rhythm: irregular  Rate: normal,         Neuro/Psych - negative ROS     Endo/Other    (+) hypothyroidism, obesity,      Comments: anticoagulated    GI/Hepatic/Renal    (+)   chronic renal disease (nephrotic syndrome) ESRD,      Other findings: Echo Summary     Left Ventricle: Normal size.The estimated left ventricular ejection fraction is 60%. This represents a normal ejection fraction. Mild concentric hypertrophy noted.    Left atrial volume is severely increased.    Mild tricuspid valve regurgitation. Mild pulmonary hypertension present.    No previous study for comparison.          Dental    (+) poor dentition and chipped                       Anesthesia Plan  Planned anesthetic: general endotracheal  GETA  [place]  Type and screen pending    ASA 4   Induction: intravenous   Anesthetic plan and risks discussed with: patient  Anesthesia plan special considerations: antiemetics,   Post-op plan: routine recovery

## 2021-06-11 NOTE — ANESTHESIA POSTPROCEDURE EVALUATION
Patient: Clayton Da Silva  EP Leadless Pacemaker - ADD ON MICRA  ORDERED BY DR BORREGO PATIENT INHOU[id number]   [facility] IS [facility] REP [facility] - MARV NOTIFIED  NERISSA FLORES IS CHECKING ON ANESTHESIA NEEDED  Anesthesia type: general    Patient location: ICU  Last vitals:   Vitals:    07/13/17 1312   BP: 137/44   Pulse: 80   Resp: 16   Temp: 36.4  C (97.6  F)   SpO2: 99%     Post vital signs: stable  Level of consciousness: awake and responds to simple questions  Post-anesthesia pain: pain controlled  Post-anesthesia nausea and vomiting: no  Pulmonary: unassisted, return to baseline  Cardiovascular: stable and blood pressure at baseline  Hydration: adequate  Anesthetic events: no    QCDR Measures:  ASA# 11 - Sanjana-op Cardiac Arrest: ASA11B - Patient did NOT experience unanticipated cardiac arrest  ASA# 12 - Sanjana-op Mortality Rate: ASA12B - Patient did NOT die  ASA# 13 - PACU Re-Intubation Rate: ASA13B - Patient did NOT require a new airway mgmt  ASA# 10 - Composite Anes Safety: ASA10A - No serious adverse event  ASA# 38 - New Corneal Injury: ASA38A - No new exposure keratitis or corneal abrasion in PACU    Additional Notes:

## 2021-06-11 NOTE — PROGRESS NOTES
[facility]      Code Status:  FULL CODE  Visit Type: H & P     Facility:  [facility] [[id number]]           History of Present Illness: Clayton Da Silva is a 76 y.o. male who is currently admitted to the TCU as a transfer from the hospital.  This is a 76-year-old with a known history of end-stage renal disease on hemodialysis 3 days a week.  He also has a history of nephrotic syndrome hypothyroidism, hypertension and A. 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  He presented to the hospital after a fall  As per him he was becoming progressively weak and was found slumped on the floor and was noted to have a heart rate of 15.  He was admitted with severe bradycardia and in the hospital was seen by cardiology and had transcutaneous pacing started.  Subsequently he was admitted and had a placement of micrA lead left pacemaker placement done on 7/13/17.  He tolerated the procedure well and will have an outpatient follow-up with cardiology  He continued with his hemodialysis and was seen by nephrology in the hospital..  In addition he has multiple wounds on his body from pruritus and falls including on the hands and knees and legs with a chronic right lower extremity wound with exposed Achilles tendon  Was given IV antibiotics in the hospital and seen by infectious disease recent wound cultures have grown Pseudomonas and enterococcus and methicillin sensitive Staphylococcus aureus on 6/29/17.  He was on oral doxycycline and Levaquin and blood cultures in the hospital were negative infectious disease saw him and recommended discontinuing all her oral antibiotics.  His MRSA swab was negative and he has been discharged with topical wound cares only  He has been discharged to the TCU      Past Medical History:   Diagnosis Date     Atrial fibrillation 7/3/2007    Overview:  chronic     Benign paroxysmal positional vertigo 3/22/2010    Overview:  Off and on     ESRD (end stage  renal disease) 6/28/2007    Overview:  On chronic hemodialysis.     Essential hypertension 6/28/2007     Gout 7/9/2017    Overview:  Polyarticular. Much better on treatment.     Hyperlipidemia 6/28/2007    Overview:  Diet controlled     Hypothyroidism 6/28/2007    Overview:  Borderline. No treatment given yet.     Long term current use of anticoagulant 10/12/2013     Nephrotic syndrome 1/11/2008    Overview:  24 hour urine protein 7979mg. Creatinine clearance 42. Kidney biopsy, focal/global glomerular sclerosis/arteriolar changes. ?Htn related. Evaluated by Nephrology (Dr Paulino).     Open wounds involving multiple regions of lower extremity 6/1/2017     Past Surgical History:   Procedure Laterality Date     LUE AVF       NH ICAR CATHETER ABLATION ATRIOVENTR NODE FUNCTION N/A 7/13/2017    Procedure: EP Leadless Pacemaker;  Surgeon: Leti Guerra MD;  Location: [facility];  Service: Cardiology     NH INSJ/RPLCMT TEMP TRANSVNS 1CHMBR ELTRD/PM CATH N/A 7/9/2017    Procedure: CV Temporary Pacemaker Insertion;  Surgeon: Radha Granger MD;  Location: [facility];  Service: Cardiology     Family History   Problem Relation Age of Onset     Stroke Mother      Social History     Social History     Marital status:      Spouse name: N/A     Number of children: N/A     Years of education: N/A     Occupational History     Not on file.     Social History Main Topics     Smoking status: Never Smoker     Smokeless tobacco: Not on file     Alcohol use No     Drug use: No     Sexual activity: Not on file     Other Topics Concern     Not on file     Social History Narrative     Current Outpatient Prescriptions   Medication Sig Dispense Refill     acetaminophen (TYLENOL) 500 MG tablet Take 1-2 tablets (500-1,000 mg total) by mouth every 6 (six) hours as needed.  0     amLODIPine (NORVASC) 10 MG tablet Take 10 mg by mouth daily.       B complex with C#20-folic acid (VIRT-CAPS) 1 mg cap capsule 1  capsule daily.        calcium acetate (PHOSLO) 667 mg capsule Take 667 mg by mouth 3 (three) times a day with meals. With each snack and if he has a third small meal       gentamicin (GARAMYCIN) 0.1 % ointment Apply 1 application topically daily. With each dressing change to multiple open wounds on lower extremities       HYDROcodone-acetaminophen 5-325 mg per tablet Take 1 tablet by mouth every 6 (six) hours as needed for pain. Pain/discomfort from multiple lower extremities wounds 20 tablet 0     hydrOXYzine (ATARAX) 10 MG tablet Take 10 mg by mouth every 8 (eight) hours as needed for itching.        levothyroxine (SYNTHROID, LEVOTHROID) 100 MCG tablet Take 100 mcg by mouth Daily at 6:00 am.        losartan (COZAAR) 100 MG tablet Take 100 mg by mouth daily.        sevelamer carbonate (RENVELA) 800 mg tablet Take 800 mg by mouth 2 (two) times a day with meals.        silver sulfADIAZINE (SILVADENE, SSD) 1 % cream To LLE achilles area wound.  Cleanse with NS, pat dry.  Apply layer of ointment to entire wound bed.  Cover with NS (lightly-moistened) gauze and secure with Kerlix. 50 g 0     triamcinolone (KENALOG) 0.5 % ointment Apply 1 application topically 2 (two) times a day.        warfarin (COUMADIN) 2 MG tablet Take by mouth daily. 7/17/17 INR 2.2. Take 2mg daily. Next INR 7/20       No current facility-administered medications for this visit.      No Known Allergies      Review of Systems:    Constitutional: Negative.  Negative for fever, chills, activity change, appetite change and fatigue.   HENT: Negative for congestion and facial swelling.    Eyes: Negative for photophobia, redness and visual disturbance.   Respiratory: Negative for cough and chest tightness.    Cardiovascular: Negative for chest pain, palpitations and leg swelling.   Gastrointestinal: Negative for nausea, diarrhea, constipation, blood in stool and abdominal distention.   Genitourinary: Negative.    Musculoskeletal: Negative.  Generalized  weakness with history of falls   Skin: Negative.    Neurological: Negative for dizziness, tremors, syncope, weakness, light-headedness and headaches.   Hematological: Does not bruise/bleed easily.   Psychiatric/Behavioral: Negative.      Vitals:    07/18/17 1610   BP: 150/74   Pulse: 62   Temp: 98  F (36.7  C)       Physical Exam:    GENERAL: no acute distress. Cooperative in conversation.   HEENT: pupils are equal, round and reactive. Oral mucosa is moist and intact.  RESP:Chest symmetric. Regular respiratory rate. No stridor.  CVS: S1S2;IRREGULAR  ABD: Nondistended, soft.  Perianal area shows significant redness  EXTREMITIES: 2+ lower extremity edema.  Chronic hyper pigmentary changes noted in his lower extremities with cold to touch. scabbing noted on multiple open areas both on his knees shins as well as in his hands  His left lower extremity has a large open area about 6 cm in size over his Achilles tendon with the tendon being exposed the margins are red.  NEURO: non focal. Alert and oriented x3.   PSYCH: within normal limits. No depression or anxiety.  SKIN: warm dry intact ; some ecchymosis noted on his right groin      Labs:    Recent Results (from the past 240 hour(s))   ECG 12 lead nursing unit performed   Result Value Ref Range    SYSTOLIC BLOOD PRESSURE  mmHg    DIASTOLIC BLOOD PRESSURE  mmHg    VENTRICULAR RATE 25 BPM    ATRIAL RATE 277 BPM    P-R INTERVAL  ms    QRS DURATION 108 ms    Q-T INTERVAL 676 ms    QTC CALCULATION (BEZET) 436 ms    P Axis  degrees    R AXIS -70 degrees    T AXIS 113 degrees    MUSE DIAGNOSIS       Junctional escape beat with sinuus arrest  Premature ventricular complexes  Abnormal ECG  No previous ECGs available  Confirmed by JEFF LUZ MD LOC:JN ([id number]) on 7/10/2017 1:01:55 PM     Basic Metabolic Panel   Result Value Ref Range    Sodium 138 136 - 145 mmol/L    Potassium 5.7 (H) 3.5 - 5.0 mmol/L    Chloride 100 98 - 107 mmol/L    CO2 21 (L) 22 - 31 mmol/L    Anion  Gap, Calculation 17 5 - 18 mmol/L    Glucose 105 70 - 125 mg/dL    Calcium 13.0 (HH) 8.5 - 10.5 mg/dL    BUN 34 (H) 8 - 28 mg/dL    Creatinine 8.48 (HH) 0.70 - 1.30 mg/dL    GFR MDRD Af Amer 7 (L) >60 mL/min/1.73m2    GFR MDRD Non Af Amer 6 (L) >60 mL/min/1.73m2   Troponin I   Result Value Ref Range    Troponin I 0.03 0.00 - 0.29 ng/mL   HM1 (CBC with Diff)   Result Value Ref Range    WBC 5.6 4.0 - 11.0 thou/uL    RBC 3.04 (L) 4.40 - 6.20 mill/uL    Hemoglobin 9.6 (L) 14.0 - 18.0 g/dL    Hematocrit 29.3 (L) 40.0 - 54.0 %    MCV 96 80 - 100 fL    MCH 31.6 27.0 - 34.0 pg    MCHC 32.8 32.0 - 36.0 g/dL    RDW 13.0 11.0 - 14.5 %    Platelets 175 140 - 440 thou/uL    MPV 10.8 8.5 - 12.5 fL    Neutrophils % 66 50 - 70 %    Lymphocytes % 15 (L) 20 - 40 %    Monocytes % 14 (H) 2 - 10 %    Eosinophils % 4 0 - 6 %    Basophils % 1 0 - 2 %    Neutrophils Absolute 3.6 2.0 - 7.7 thou/uL    Lymphocytes Absolute 0.9 0.8 - 4.4 thou/uL    Monocytes Absolute 0.8 0.0 - 0.9 thou/uL    Eosinophils Absolute 0.2 0.0 - 0.4 thou/uL    Basophils Absolute 0.1 0.0 - 0.2 thou/uL   INR   Result Value Ref Range    INR 12.05 (HH) 0.90 - 1.10   POCT Glucose   Result Value Ref Range    Glucose,  mg/dL   Calcium, Ionized, Measured   Result Value Ref Range    Calcium, Ionized Measured 1.31 (H) 1.11 - 1.30 mmol/L    Calcium, Ionized pH 7.4 1.27 1.11 - 1.30 mmol/L    pH 7.32 (L) 7.35 - 7.45   Basic Metabolic Panel   Result Value Ref Range    Sodium 140 136 - 145 mmol/L    Potassium 5.1 (H) 3.5 - 5.0 mmol/L    Chloride 100 98 - 107 mmol/L    CO2 24 22 - 31 mmol/L    Anion Gap, Calculation 16 5 - 18 mmol/L    Glucose 30 (LL) 70 - 125 mg/dL    Calcium 10.4 8.5 - 10.5 mg/dL    BUN 35 (H) 8 - 28 mg/dL    Creatinine 8.79 (HH) 0.70 - 1.30 mg/dL    GFR MDRD Af Amer 7 (L) >60 mL/min/1.73m2    GFR MDRD Non Af Amer 6 (L) >60 mL/min/1.73m2   Lactic Acid   Result Value Ref Range    Lactic Acid 2.7 (H) 0.5 - 2.2 mmol/L   Culture, Blood   Result Value Ref Range     Anaerobic Blood Culture Bottle No Growth No Growth, No organisms seen, bottle returned to instrument, Specimen not received    Aerobic Blood Culture Bottle No Growth No Growth, No organisms seen, bottle returned to instrument, Specimen not received   TSH   Result Value Ref Range    TSH 4.09 0.30 - 5.00 uIU/mL   Culture, Blood   Result Value Ref Range    Anaerobic Blood Culture Bottle No Growth No Growth, No organisms seen, bottle returned to instrument, Specimen not received    Aerobic Blood Culture Bottle No Growth No Growth, No organisms seen, bottle returned to instrument, Specimen not received   Parathyroid Hormone Intact   Result Value Ref Range     (H) 10 - 86 pg/mL   Procalcitonin   Result Value Ref Range    Procalcitonin 0.38 0.00 - 0.49 ng/mL   ABO/Rh   Result Value Ref Range    ABORh B NEG    Type and screen   Result Value Ref Range    ABORh B NEG     Antibody Screen Negative Negative   POCT Glucose   Result Value Ref Range    Glucose, POC 24 mg/dL   POCT Glucose   Result Value Ref Range    Glucose,  mg/dL   POCT Glucose   Result Value Ref Range    Glucose, POC 77 mg/dL   Blood Gases, Venous   Result Value Ref Range    pH, Venous 7.32 (L) 7.35 - 7.45    pCO2, Venous 60 (H) 35 - 50 mm Hg    pO2, James 65 (H) 25 - 47 mm Hg    Base Excess, Venous 3.7 mmol/L    HCO3, Venous 27.7 24.0 - 30.0 mmol/L    Oxyhemoglobin 91.4 (H) 70.0 - 75.0 %    O2 Sat, Venous 95.0 (H) 70.0 - 75.0 %   POCT Glucose   Result Value Ref Range    Glucose, POC 79 mg/dL   POCT Glucose   Result Value Ref Range    Glucose, POC 80 mg/dL   Lactic Acid   Result Value Ref Range    Lactic Acid 1.2 0.5 - 2.2 mmol/L   HM2 (CBC without diff)   Result Value Ref Range    WBC 2.9 (L) 4.0 - 11.0 thou/uL    RBC 2.91 (L) 4.40 - 6.20 mill/uL    Hemoglobin 9.0 (L) 14.0 - 18.0 g/dL    Hematocrit 27.2 (L) 40.0 - 54.0 %    MCV 94 80 - 100 fL    MCH 30.9 27.0 - 34.0 pg    MCHC 33.1 32.0 - 36.0 g/dL    RDW 12.8 11.0 - 14.5 %    Platelets 104 (L)  140 - 440 thou/uL    MPV 10.1 8.5 - 12.5 fL   Protime-INR   Result Value Ref Range    INR 2.27 (H) 0.90 - 1.10   Renal function panel   Result Value Ref Range    Albumin 3.4 (L) 3.5 - 5.0 g/dL    Calcium 10.4 8.5 - 10.5 mg/dL    Phosphorus 6.3 (H) 2.5 - 4.5 mg/dL    Glucose 82 70 - 125 mg/dL    BUN 38 (H) 8 - 28 mg/dL    Creatinine 8.57 (HH) 0.70 - 1.30 mg/dL    Sodium 141 136 - 145 mmol/L    Potassium 6.0 (H) 3.5 - 5.0 mmol/L    Chloride 99 98 - 107 mmol/L    CO2 22 22 - 31 mmol/L    Anion Gap, Calculation 20 (H) 5 - 18 mmol/L    GFR MDRD Af Amer 7 (L) >60 mL/min/1.73m2    GFR MDRD Non Af Amer 6 (L) >60 mL/min/1.73m2   Magnesium   Result Value Ref Range    Magnesium 1.8 1.8 - 2.6 mg/dL   POCT Glucose   Result Value Ref Range    Glucose, POC 81 mg/dL   POCT Glucose   Result Value Ref Range    Glucose, POC 77 mg/dL   Echo Complete   Result Value Ref Range    LV volume diastolic 116 62 - 150 cm3    LV volume systolic 53 21 - 61 cm3    HR 49 bpm    IVSd 1.4 0.6 - 1.0 cm    LVIDd 5.7 4.2 - 5.8 cm    LVIDs 3.2 2.5 - 4.0 cm    LVOT diam 2.3 cm    LVOT mean gradient 2 mmHg    LVOT peak VTI 29.9 cm    LVOT mean alexa 72.1 cm/s    LVOT peak alexa 109 cm/s    LVOT peak gradient 5 mmHg    LV PWd 1.3 0.6 - 1.0 cm    MV E' lat alexa 8.38 cm/s    MV E' med alexa 4.78 cm/s    AV mean alexa 124 cm/s    AV mean gradient 7 mmHg    AV VTI 49.5 cm    AV peak alexa 190 cm/s    AO root 3.8 cm    LA size 5.1 cm    AR peak alexa 117 cm/s    AR peak gradient 5 mmHg    TR peak alexa 300 cm/s    LA area 2 38 cm2    LA area 1 40.8 cm2    LA length 7.28 cm    Hieght 68 in    Weight 3770.75 lbs    /75 mmHg    IVS/PW ratio 1.1     TR peak gradent 36.0 mmHg    LV FS 43.9 28 - 44 %    Echo LVEF calculated 54 55 - 75 %    LA volume 181.0 cm3    LV mass 339.6 g    AV area 2.5 cm2    AV DIM IND alexa 0.6     LVOT area 4.15 cm2    LVOT .2 cm3    AV peak gradient 14.4 mmHg    LV CO 6.1 l/min    Height 68.0 in    Weight 236 lbs    AV DIM IND VTI 0.6     Echo  LVEF Estimated 55 %   POCT Glucose   Result Value Ref Range    Glucose, POC 68 mg/dL   POCT Glucose   Result Value Ref Range    Glucose, POC 88 mg/dL   Procalcitonin   Result Value Ref Range    Procalcitonin 3.42 (H) 0.00 - 0.49 ng/mL   POCT Glucose   Result Value Ref Range    Glucose,  mg/dL   POCT Glucose   Result Value Ref Range    Glucose, POC 90 mg/dL   Plasma Product Information   Result Value Ref Range    Unit Type B Neg     Blood Expiration Date 20170711011700     Unit Number [id number]     Status Transfused     Component FROZEN PLASMA     PRODUCT CODE [id number]     Issue Date and Time 20170710012000     Blood Type 1700     CODING SYSTEM [id number]    Plasma Product Information   Result Value Ref Range    Unit Type B Neg     Blood Expiration Date 20170711011700     Unit Number [id number]     Status Transfused     Component FROZEN PLASMA     PRODUCT CODE [id number]     Issue Date and Time 20170710012000     Blood Type 1700     CODING SYSTEM [id number]    Plasma Product Information   Result Value Ref Range    Unit Type B Pos     Blood Expiration Date 20170711034000     Unit Number [id number]     Status Transfused     Component FROZEN PLASMA     PRODUCT CODE [id number]     Issue Date and Time 20170710034300     Blood Type 7300     CODING SYSTEM [id number]    POCT Glucose   Result Value Ref Range    Glucose, POC 70 mg/dL   Protime-INR   Result Value Ref Range    INR 1.57 (H) 0.90 - 1.10   Comprehensive Metabolic Panel   Result Value Ref Range    Sodium 139 136 - 145 mmol/L    Potassium 5.0 3.5 - 5.0 mmol/L    Chloride 100 98 - 107 mmol/L    CO2 25 22 - 31 mmol/L    Anion Gap, Calculation 14 5 - 18 mmol/L    Glucose 87 70 - 125 mg/dL    BUN 29 (H) 8 - 28 mg/dL    Creatinine 7.06 (HH) 0.70 - 1.30 mg/dL    GFR MDRD Af Amer 9 (L) >60 mL/min/1.73m2    GFR MDRD Non Af Amer 8 (L) >60 mL/min/1.73m2    Bilirubin, Total 0.9 0.0 - 1.0 mg/dL    Calcium 9.6 8.5 - 10.5 mg/dL    Protein, Total 6.8 6.0 - 8.0 g/dL    Albumin  3.2 (L) 3.5 - 5.0 g/dL    Alkaline Phosphatase 107 45 - 120 U/L    AST 60 (H) 0 - 40 U/L    ALT 43 0 - 45 U/L   Magnesium   Result Value Ref Range    Magnesium 1.7 (L) 1.8 - 2.6 mg/dL   HM1 (CBC with Diff)   Result Value Ref Range    WBC 4.6 4.0 - 11.0 thou/uL    RBC 3.08 (L) 4.40 - 6.20 mill/uL    Hemoglobin 9.6 (L) 14.0 - 18.0 g/dL    Hematocrit 28.7 (L) 40.0 - 54.0 %    MCV 93 80 - 100 fL    MCH 31.2 27.0 - 34.0 pg    MCHC 33.4 32.0 - 36.0 g/dL    RDW 13.0 11.0 - 14.5 %    Platelets 119 (L) 140 - 440 thou/uL    MPV 10.3 8.5 - 12.5 fL    Neutrophils % 80 (H) 50 - 70 %    Lymphocytes % 7 (L) 20 - 40 %    Monocytes % 11 (H) 2 - 10 %    Eosinophils % 2 0 - 6 %    Basophils % 1 0 - 2 %    Neutrophils Absolute 3.7 2.0 - 7.7 thou/uL    Lymphocytes Absolute 0.3 (L) 0.8 - 4.4 thou/uL    Monocytes Absolute 0.5 0.0 - 0.9 thou/uL    Eosinophils Absolute 0.1 0.0 - 0.4 thou/uL    Basophils Absolute 0.0 0.0 - 0.2 thou/uL   Vancomycin (Vancocin )   Result Value Ref Range    Vancomycin 11.1 <=25.0 ug/mL   POCT Glucose   Result Value Ref Range    Glucose, POC 86 mg/dL   MRSA culture   Result Value Ref Range    Culture No MRSA isolated    POCT Glucose   Result Value Ref Range    Glucose,  mg/dL   POCT Glucose   Result Value Ref Range    Glucose,  mg/dL   POCT Glucose   Result Value Ref Range    Glucose,  mg/dL   POCT Glucose   Result Value Ref Range    Glucose, POC 94 mg/dL   POCT Glucose   Result Value Ref Range    Glucose,  mg/dL   Protime-INR   Result Value Ref Range    INR 1.40 (H) 0.90 - 1.10   Comprehensive Metabolic Panel   Result Value Ref Range    Sodium 139 136 - 145 mmol/L    Potassium 4.8 3.5 - 5.0 mmol/L    Chloride 101 98 - 107 mmol/L    CO2 23 22 - 31 mmol/L    Anion Gap, Calculation 15 5 - 18 mmol/L    Glucose 96 70 - 125 mg/dL    BUN 43 (H) 8 - 28 mg/dL    Creatinine 8.80 (HH) 0.70 - 1.30 mg/dL    GFR MDRD Af Amer 7 (L) >60 mL/min/1.73m2    GFR MDRD Non Af Amer 6 (L) >60 mL/min/1.73m2     Bilirubin, Total 0.9 0.0 - 1.0 mg/dL    Calcium 9.5 8.5 - 10.5 mg/dL    Protein, Total 6.7 6.0 - 8.0 g/dL    Albumin 3.0 (L) 3.5 - 5.0 g/dL    Alkaline Phosphatase 97 45 - 120 U/L    AST 45 (H) 0 - 40 U/L    ALT 35 0 - 45 U/L   HM1 (CBC with Diff)   Result Value Ref Range    WBC 6.4 4.0 - 11.0 thou/uL    RBC 3.32 (L) 4.40 - 6.20 mill/uL    Hemoglobin 10.3 (L) 14.0 - 18.0 g/dL    Hematocrit 30.9 (L) 40.0 - 54.0 %    MCV 93 80 - 100 fL    MCH 31.0 27.0 - 34.0 pg    MCHC 33.3 32.0 - 36.0 g/dL    RDW 13.2 11.0 - 14.5 %    Platelets 124 (L) 140 - 440 thou/uL    MPV 9.8 8.5 - 12.5 fL    Neutrophils % 73 (H) 50 - 70 %    Lymphocytes % 8 (L) 20 - 40 %    Monocytes % 14 (H) 2 - 10 %    Eosinophils % 4 0 - 6 %    Basophils % 1 0 - 2 %    Neutrophils Absolute 4.6 2.0 - 7.7 thou/uL    Lymphocytes Absolute 0.5 (L) 0.8 - 4.4 thou/uL    Monocytes Absolute 0.9 0.0 - 0.9 thou/uL    Eosinophils Absolute 0.3 0.0 - 0.4 thou/uL    Basophils Absolute 0.1 0.0 - 0.2 thou/uL   POCT Glucose   Result Value Ref Range    Glucose,  mg/dL   Protime-INR   Result Value Ref Range    INR 1.37 (H) 0.90 - 1.10   Comprehensive Metabolic Panel   Result Value Ref Range    Sodium 144 136 - 145 mmol/L    Potassium 4.5 3.5 - 5.0 mmol/L    Chloride 102 98 - 107 mmol/L    CO2 25 22 - 31 mmol/L    Anion Gap, Calculation 17 5 - 18 mmol/L    Glucose 92 70 - 125 mg/dL    BUN 36 (H) 8 - 28 mg/dL    Creatinine 7.96 (HH) 0.70 - 1.30 mg/dL    GFR MDRD Af Amer 8 (L) >60 mL/min/1.73m2    GFR MDRD Non Af Amer 7 (L) >60 mL/min/1.73m2    Bilirubin, Total 0.9 0.0 - 1.0 mg/dL    Calcium 9.9 8.5 - 10.5 mg/dL    Protein, Total 6.7 6.0 - 8.0 g/dL    Albumin 2.9 (L) 3.5 - 5.0 g/dL    Alkaline Phosphatase 96 45 - 120 U/L    AST 33 0 - 40 U/L    ALT 31 0 - 45 U/L   Vancomycin (Vancocin )   Result Value Ref Range    Vancomycin 18.5 <=25.0 ug/mL   Type and Screen   Result Value Ref Range    ABORh B NEG     Antibody Screen Negative Negative   HM1 (CBC with Diff)   Result Value  Ref Range    WBC 6.1 4.0 - 11.0 thou/uL    RBC 3.62 (L) 4.40 - 6.20 mill/uL    Hemoglobin 11.0 (L) 14.0 - 18.0 g/dL    Hematocrit 32.9 (L) 40.0 - 54.0 %    MCV 91 80 - 100 fL    MCH 30.4 27.0 - 34.0 pg    MCHC 33.4 32.0 - 36.0 g/dL    RDW 13.2 11.0 - 14.5 %    Platelets 116 (L) 140 - 440 thou/uL    MPV 9.8 8.5 - 12.5 fL    Neutrophils % 74 (H) 50 - 70 %    Lymphocytes % 10 (L) 20 - 40 %    Monocytes % 12 (H) 2 - 10 %    Eosinophils % 4 0 - 6 %    Basophils % 1 0 - 2 %    Neutrophils Absolute 4.4 2.0 - 7.7 thou/uL    Lymphocytes Absolute 0.6 (L) 0.8 - 4.4 thou/uL    Monocytes Absolute 0.7 0.0 - 0.9 thou/uL    Eosinophils Absolute 0.3 0.0 - 0.4 thou/uL    Basophils Absolute 0.1 0.0 - 0.2 thou/uL   ECG 12 lead MUSE   Result Value Ref Range    SYSTOLIC BLOOD PRESSURE  mmHg    DIASTOLIC BLOOD PRESSURE  mmHg    VENTRICULAR RATE 65 BPM    ATRIAL RATE 65 BPM    P-R INTERVAL  ms    QRS DURATION 160 ms    Q-T INTERVAL 492 ms    QTC CALCULATION (BEZET) 511 ms    P Axis  degrees    R AXIS 97 degrees    T AXIS -41 degrees    MUSE DIAGNOSIS       Atrial fibrillation with premature ventricular or aberrantly conducted complexes  Right bundle branch block  Septal infarct (cited on or before 13-JUL-2017)  T wave abnormality, consider inferior ischemia or digitalis effect  Abnormal ECG  When compared with ECG of 09-JUL-2017 20:33,  Previous ECG appeared escaped rhythm  Vent. rate has increased 40 BPM  Confirmed by NORA AYALA MD LOC:JN [id number]) on 7/13/2017 3:12:56 PM     POCT Glucose   Result Value Ref Range    Glucose,  mg/dL   Protime-INR   Result Value Ref Range    INR 1.34 (H) 0.90 - 1.10   Comprehensive Metabolic Panel   Result Value Ref Range    Sodium 143 136 - 145 mmol/L    Potassium 5.3 (H) 3.5 - 5.0 mmol/L    Chloride 102 98 - 107 mmol/L    CO2 20 (L) 22 - 31 mmol/L    Anion Gap, Calculation 21 (H) 5 - 18 mmol/L    Glucose 115 70 - 125 mg/dL    BUN 50 (H) 8 - 28 mg/dL    Creatinine 9.63 (HH) 0.70 - 1.30 mg/dL    GFR  MDRD Af Amer 6 (L) >60 mL/min/1.73m2    GFR MDRD Non Af Amer 5 (L) >60 mL/min/1.73m2    Bilirubin, Total 0.5 0.0 - 1.0 mg/dL    Calcium 9.5 8.5 - 10.5 mg/dL    Protein, Total 6.3 6.0 - 8.0 g/dL    Albumin 2.6 (L) 3.5 - 5.0 g/dL    Alkaline Phosphatase 77 45 - 120 U/L    AST 23 0 - 40 U/L    ALT 22 0 - 45 U/L   HM1 (CBC with Diff)   Result Value Ref Range    WBC 5.4 4.0 - 11.0 thou/uL    RBC 3.24 (L) 4.40 - 6.20 mill/uL    Hemoglobin 9.9 (L) 14.0 - 18.0 g/dL    Hematocrit 31.0 (L) 40.0 - 54.0 %    MCV 96 80 - 100 fL    MCH 30.6 27.0 - 34.0 pg    MCHC 31.9 (L) 32.0 - 36.0 g/dL    RDW 13.7 11.0 - 14.5 %    Platelets 119 (L) 140 - 440 thou/uL    MPV 10.5 8.5 - 12.5 fL    Neutrophils % 79 (H) 50 - 70 %    Lymphocytes % 11 (L) 20 - 40 %    Monocytes % 10 2 - 10 %    Eosinophils % 0 0 - 6 %    Basophils % 0 0 - 2 %    Neutrophils Absolute 4.2 2.0 - 7.7 thou/uL    Lymphocytes Absolute 0.6 (L) 0.8 - 4.4 thou/uL    Monocytes Absolute 0.5 0.0 - 0.9 thou/uL    Eosinophils Absolute 0.0 0.0 - 0.4 thou/uL    Basophils Absolute 0.0 0.0 - 0.2 thou/uL   POCT Glucose   Result Value Ref Range    Glucose,  mg/dL   POCT Glucose   Result Value Ref Range    Glucose,  mg/dL   Protime-INR   Result Value Ref Range    INR 1.43 (H) 0.90 - 1.10   Comprehensive Metabolic Panel   Result Value Ref Range    Sodium 146 (H) 136 - 145 mmol/L    Potassium 4.5 3.5 - 5.0 mmol/L    Chloride 103 98 - 107 mmol/L    CO2 21 (L) 22 - 31 mmol/L    Anion Gap, Calculation 22 (H) 5 - 18 mmol/L    Glucose 95 70 - 125 mg/dL    BUN 41 (H) 8 - 28 mg/dL    Creatinine 7.93 (HH) 0.70 - 1.30 mg/dL    GFR MDRD Af Amer 8 (L) >60 mL/min/1.73m2    GFR MDRD Non Af Amer 7 (L) >60 mL/min/1.73m2    Bilirubin, Total 0.4 0.0 - 1.0 mg/dL    Calcium 9.6 8.5 - 10.5 mg/dL    Protein, Total 6.0 6.0 - 8.0 g/dL    Albumin 2.4 (L) 3.5 - 5.0 g/dL    Alkaline Phosphatase 76 45 - 120 U/L    AST 19 0 - 40 U/L    ALT 18 0 - 45 U/L   HM1 (CBC with Diff)   Result Value Ref Range     WBC 3.7 (L) 4.0 - 11.0 thou/uL    RBC 3.05 (L) 4.40 - 6.20 mill/uL    Hemoglobin 9.4 (L) 14.0 - 18.0 g/dL    Hematocrit 27.7 (L) 40.0 - 54.0 %    MCV 91 80 - 100 fL    MCH 30.8 27.0 - 34.0 pg    MCHC 33.9 32.0 - 36.0 g/dL    RDW 13.5 11.0 - 14.5 %    Platelets 102 (L) 140 - 440 thou/uL    MPV 10.0 8.5 - 12.5 fL    Neutrophils % 66 50 - 70 %    Lymphocytes % 15 (L) 20 - 40 %    Monocytes % 13 (H) 2 - 10 %    Eosinophils % 4 0 - 6 %    Basophils % 1 0 - 2 %    Neutrophils Absolute 2.4 2.0 - 7.7 thou/uL    Lymphocytes Absolute 0.6 (L) 0.8 - 4.4 thou/uL    Monocytes Absolute 0.5 0.0 - 0.9 thou/uL    Eosinophils Absolute 0.2 0.0 - 0.4 thou/uL    Basophils Absolute 0.0 0.0 - 0.2 thou/uL   Magnesium   Result Value Ref Range    Magnesium 2.0 1.8 - 2.6 mg/dL   Protime-INR   Result Value Ref Range    INR 1.64 (H) 0.90 - 1.10   Comprehensive Metabolic Panel   Result Value Ref Range    Sodium 144 136 - 145 mmol/L    Potassium 4.4 3.5 - 5.0 mmol/L    Chloride 99 98 - 107 mmol/L    CO2 18 (L) 22 - 31 mmol/L    Anion Gap, Calculation 27 (H) 5 - 18 mmol/L    Glucose 88 70 - 125 mg/dL    BUN 52 (H) 8 - 28 mg/dL    Creatinine 9.58 (HH) 0.70 - 1.30 mg/dL    GFR MDRD Af Amer 7 (L) >60 mL/min/1.73m2    GFR MDRD Non Af Amer 5 (L) >60 mL/min/1.73m2    Bilirubin, Total 0.5 0.0 - 1.0 mg/dL    Calcium 9.6 8.5 - 10.5 mg/dL    Protein, Total 6.0 6.0 - 8.0 g/dL    Albumin 2.5 (L) 3.5 - 5.0 g/dL    Alkaline Phosphatase 75 45 - 120 U/L    AST 19 0 - 40 U/L    ALT 18 0 - 45 U/L   HM1 (CBC with Diff)   Result Value Ref Range    WBC 3.0 (L) 4.0 - 11.0 thou/uL    RBC 2.93 (L) 4.40 - 6.20 mill/uL    Hemoglobin 9.0 (L) 14.0 - 18.0 g/dL    Hematocrit 26.9 (L) 40.0 - 54.0 %    MCV 92 80 - 100 fL    MCH 30.7 27.0 - 34.0 pg    MCHC 33.5 32.0 - 36.0 g/dL    RDW 13.5 11.0 - 14.5 %    Platelets 83 (L) 140 - 440 thou/uL    MPV 10.2 8.5 - 12.5 fL    Neutrophils % 57 50 - 70 %    Lymphocytes % 22 20 - 40 %    Monocytes % 15 (H) 2 - 10 %    Eosinophils % 6 0 - 6  %    Basophils % 1 0 - 2 %    Neutrophils Absolute 1.7 (L) 2.0 - 7.7 thou/uL    Lymphocytes Absolute 0.7 (L) 0.8 - 4.4 thou/uL    Monocytes Absolute 0.5 0.0 - 0.9 thou/uL    Eosinophils Absolute 0.2 0.0 - 0.4 thou/uL    Basophils Absolute 0.0 0.0 - 0.2 thou/uL     Xr Chest Ap Portable    Result Date: 7/11/2017  XR CHEST AP PORTABLE 7/11/2017 5:40 AM INDICATION: chf renal failure COMPARISON: 7/10/2017 FINDINGS: Cardiomegaly. Central pulmonary vascular congestion. No edema. No new focal infiltrate. Mild linear atelectases or scarring left lung base stable.    Xr Chest Ap Portable    Result Date: 7/10/2017  XR CHEST AP PORTABLE 7/10/2017 1:48 AM INDICATION: respiratory failure COMPARISON: 7/9/2017 FINDINGS: Cardiomediastinal enlargement. Atherosclerotic aorta. Mild venous congestion. No significant effusion. Elevation right hemidiaphragm.    Xr Chest Ap Portable    Result Date: 7/9/2017  XR CHEST AP PORTABLE 7/9/2017 9:18 PM INDICATION: Bradycardia. COMPARISON: None. FINDINGS: Defibrillator pad projecting over the chest. Mild interstitial prominence, though no focal consolidation. Findings may reflect mild vascular congestion. There is moderate/severe cardiac silhouette enlargement; cannot exclude pericardial effusion. No evidence of right pleural effusion, though exclusion of the left costophrenic angle. No pneumothorax. Dense aortic atherosclerosis.     Xr Chest Pa And Lateral    Result Date: 7/14/2017  XR CHEST PA AND LATERAL 7/14/2017 1:35 PM INDICATION: s/p leadless pacemaker COMPARISON: 7/11/2017 FINDINGS: New right IJ catheter tip over the mid-distal SVC. External material or pad over the chest. Improved pulmonary edema since prior. Mild left basilar atelectasis and/or small pleural effusion. Stable cardiac silhouette enlargement. Severe aortic atherosclerosis. Coronary calcifications.     Ir Picc Placement 5+ Years    Result Date: 7/13/2017  [facility] PROCEDURE: SMALL BORE CENTRAL CATHETER (SBCC)  PLACEMENT 1.  Ultrasound-guided access of the right internal jugular vein. A permanent image was stored. 2.  Placement of a small bore central venous catheter. 3.  Moderate sedation. ATTENDING: Parker George MD. INDICATION: 76-year-old male with end-stage renal disease and limited central venous access options. The patient has a planned pacemaker placement later today (side unspecified). He has a left arm arterial venous dialysis fistula and right upper arm swelling. CONSENT: The risks, benefits, and alternatives of small bore central catheter placement with possible venography were discussed with the patient in detail. All questions were answered. Informed consent was given to proceed with the procedure. MODERATE SEDATION: None. ADDITIONAL MEDICATIONS: 1% lidocaine for local anesthesia. FLUOROSCOPIC TIME: 0.1 minutes. AIR KERMA:  6 mGy. CONTRAST: None. UNIVERSAL PRECAUTIONS: The procedure was performed utilizing maximum sterile barrier technique. Prior to the start of the procedure, a standard pause for patient safety was performed with site marking as indicated. COMPLICATIONS: No immediate complications. STERILE TECHNIQUE: The procedure was performed using maximum sterile barrier technique. The interventionalist and assistants performed appropriate hand hygiene and wore a hat, mask, sterile gown, and sterile gloves during the entire procedure. PROCEDURE:   Using real-time ultrasound guidance, the right internal jugular vein was punctured. A subcutaneous tunnel was created requiring a second incision, primarily for patient comfort. Using this access, a 5 [place]i, 18 cm long     lumen tunneled small bore central catheter was placed under fluoroscopic guidance with its tip at the cavoatrial junction. The catheter may be used immediately. FINDINGS: Ultrasound shows an anechoic and compressible jugular vein. At the completion of the study, the new catheter tip lies at the cavoatrial junction.     1. Small bore central  catheter placement, as discussed above. 2. The catheter is ready for use. ____________________________________________________________________ CPT codes for physician reference only: 06347 [id number]              Ir Av Fistulagram    Result Date: 7/14/2017  [facility] 7/14/2017 PROCEDURE: AV DIALYSIS FISTULOGRAM AND PTA VENOUS: 1. AV DIALYSIS FISTULOGRAM ATTENDING:  Jovanny Garcia MD. INDICATION: Left forearm arteriovenous fistula with decreased inadequate blood flow during dialysis. SEDATION: Versed 0.5 mg. Fentanyl 25 mcg. The procedure was performed with administration intravenous conscious sedation with appropriate preoperative, intraoperative, and postoperative evaluation. 15 minutes of supervised face to face conscious sedation  time was provided by a radiology nurse under my direct supervision. FLUOROSCOPIC TIME: 1.2 minutes. AIR KERMA (Ka,r): 45 mGy. CONTRAST: 5 cc of Omni 350 COMPLICATIONS:  No immediate complications. PROCEDURE: The left arm was prepped and draped in the usual sterile fashion followed by local anesthesia with 1% Xylocaine. Access to the fistula was achieved using a micropuncture system. Diagnostic fistulography was performed from the arterial inflow to the right  atrium. The sheath was removed and compression applied to the puncture site until hemostasis was achieved. There was a palpable thrill in the fistula at the completion of the procedure. FINDINGS: AV DIALYSIS FISTULOGRAM: Widely patent anastomosis, outflow vein and central veins.     1.  Left arm fistulogram demonstrates widely patent anastomosis, outflow vein and central veins. No intervention was required or indicated. CPT code for physician reference only: 70760    Us Venous Arm Right    Result Date: 7/15/2017  US VENOUS ARM RIGHT 7/14/2017 11:52 PM INDICATION: Arm edema. TECHNIQUE: Duplex exam performed utilizing 2D gray-scale imaging, Doppler interrogation with color-flow and spectral waveform analysis. Exam  performed without and with compression when possible. COMPARISON: None. FINDINGS: Ultrasound includes evaluation of the internal jugular veins, innominate veins, subclavian veins, axillary veins, and brachial veins. The superficial cephalic and basilic veins were also evaluated where seen. The opposite subclavian vein medially was also included in the evaluation. RIGHT ARM VEINS: Negative for DVT. There is occlusive thrombus in the basilic vein within the elbow extending to the forearm to the level of the wrist.     CONCLUSION: 1.  The right arm veins are negative for DVT. Occlusive superficial thrombophlebitis right basilic vein extending from the elbow to the wrist. NOTE: ABNORMAL REPORT THE DICTATION ABOVE DESCRIBES AN ABNORMALITY FOR WHICH FOLLOW-UP IS NEEDED.       Assessment/Plan:   Bradycardia severe/Valdez-Duran syncope with heart rates on 15 on admission  He was evaluated by cardiology and has had MICRA-placement which is a lead leSS pacemaker done on 7/13/17.  He tolerated the procedure well and will follow up with cardiology as an outpatient  Chronic lower extremity wounds with cultures showing Pseudomonas enterococcus and MRSA  Patient was on doxycycline and Levaquin on outpatient basis and seen by infectious disease  Repeat blood cultures are negative MRSA swab was negative he was given IV antibiotics prior to pacemaker placement in the hospital  But now has been discharged with topical wound care only  End-stage renal disease on hemodialysis 3 days a week.  He was managed by nephrology in the hospital he underwent a fistulogram of his AV fistula with did not show any significant stenoses  Hypertension on Cozaar and Norvasc monitor blood pressures were elevated initially on admission  Anemia on chronic erythropoietin   secondary hyperparathyroidism on binders PhosLo and Renvela  Chronic A. fib monitor INRs;2.1 TODAY  History of peripheral vascular disease  History of gout  History of hypothyroidism on  replacement Synthroid  BPPV-  Debilitation weakness with history of fall felt to be brought in by bradycardia here for PT OT and rehab  Total time spent was 45 minutes, more than half of it was in face-to-face counseling regarding disease state, treatment, side effects, documentation, review of clinical data and coordination of care  Electronically signed by: ANGELA Morales  This progress note was completed using Dragon software and there may be grammatical errors.

## 2021-06-12 NOTE — PROGRESS NOTES
[facility]      Code Status:  FULL CODE  Visit Type: Review Of Multiple Medical Conditions     Facility:  [facility] [[id number]]           History of Present Illness: Clayton Da Silva is a 76 y.o. male who is currently admitted to the TCU as a transfer from the hospital.  This is a 76-year-old with a known history of end-stage renal disease on hemodialysis 3 days a week.  He also has a history of nephrotic syndrome hypothyroidism, hypertension and A. 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  He presented to the hospital after a fall  As per him he was becoming progressively weak and was found slumped on the floor and was noted to have a heart rate of 15.  He was admitted with severe bradycardia and in the hospital was seen by cardiology and had transcutaneous pacing started.  Subsequently he was admitted and had a placement of micrA lead left pacemaker placement done on 7/13/17.  He tolerated the procedure well and will have an outpatient follow-up with cardiology  He continued with his hemodialysis and was seen by nephrology in the hospital..  In addition he has multiple wounds on his body from pruritus and falls including on the hands and knees and legs with a chronic right lower extremity wound with exposed Achilles tendon  Was given IV antibiotics in the hospital and seen by infectious disease recent wound cultures have grown Pseudomonas and enterococcus and methicillin sensitive Staphylococcus aureus on 6/29/17.  He was on oral doxycycline and Levaquin and blood cultures in the hospital were negative infectious disease saw him and recommended discontinuing all her oral antibiotics.  His MRSA swab was negative and he has been discharged with topical wound cares only.  His most of the wounds are healing up nicely with scabs noted and no secondary infection his left ankle wound is stable  He has been discharged to the TCU  He went to dialysis yesterday and  complaining of severe fatigue and tiredness appetite has been good no new other concerns.    Past Medical History:   Diagnosis Date     Atrial fibrillation 7/3/2007    Overview:  chronic     Benign paroxysmal positional vertigo 3/22/2010    Overview:  Off and on     ESRD (end stage renal disease) 6/28/2007    Overview:  On chronic hemodialysis.     Essential hypertension 6/28/2007     Gout 7/9/2017    Overview:  Polyarticular. Much better on treatment.     Hyperlipidemia 6/28/2007    Overview:  Diet controlled     Hypothyroidism 6/28/2007    Overview:  Borderline. No treatment given yet.     Long term current use of anticoagulant 10/12/2013     Nephrotic syndrome 1/11/2008    Overview:  24 hour urine protein 7979mg. Creatinine clearance 42. Kidney biopsy, focal/global glomerular sclerosis/arteriolar changes. ?Htn related. Evaluated by Nephrology (Dr Paulino).     Open wounds involving multiple regions of lower extremity 6/1/2017     Past Surgical History:   Procedure Laterality Date     LUE AVF       CA ICAR CATHETER ABLATION ATRIOVENTR NODE FUNCTION N/A 7/13/2017    Procedure: EP Leadless Pacemaker;  Surgeon: Leti Guerra MD;  Location: [facility] Cath Lab;  Service: Cardiology     CA INSJ/RPLCMT TEMP TRANSVNS 1CHMBR ELTRD/PM CATH N/A 7/9/2017    Procedure: CV Temporary Pacemaker Insertion;  Surgeon: Radha Granger MD;  Location: [facility] Cath Lab;  Service: Cardiology     Family History   Problem Relation Age of Onset     Stroke Mother      Social History     Social History     Marital status:      Spouse name: N/A     Number of children: N/A     Years of education: N/A     Occupational History     Not on file.     Social History Main Topics     Smoking status: Never Smoker     Smokeless tobacco: Not on file     Alcohol use No     Drug use: No     Sexual activity: Not on file     Other Topics Concern     Not on file     Social History Narrative     Current Outpatient Prescriptions    Medication Sig Dispense Refill     acetaminophen (TYLENOL) 500 MG tablet Take 1-2 tablets (500-1,000 mg total) by mouth every 6 (six) hours as needed.  0     amLODIPine (NORVASC) 10 MG tablet Take 10 mg by mouth daily.       B complex with C#20-folic acid (VIRT-CAPS) 1 mg cap capsule 1 capsule daily.        calcium acetate (PHOSLO) 667 mg capsule Take 667 mg by mouth 3 (three) times a day with meals. With each snack and if he has a third small meal       gentamicin (GARAMYCIN) 0.1 % ointment Apply 1 application topically daily. With each dressing change to multiple open wounds on lower extremities       HYDROcodone-acetaminophen 5-325 mg per tablet Take 1 tablet by mouth every 6 (six) hours as needed for pain. Pain/discomfort from multiple lower extremities wounds 20 tablet 0     hydrOXYzine (ATARAX) 10 MG tablet Take 10 mg by mouth every 8 (eight) hours as needed for itching.        levothyroxine (SYNTHROID, LEVOTHROID) 100 MCG tablet Take 100 mcg by mouth Daily at 6:00 am.        losartan (COZAAR) 100 MG tablet Take 100 mg by mouth daily.        sevelamer carbonate (RENVELA) 800 mg tablet Take 800 mg by mouth 2 (two) times a day with meals.        silver sulfADIAZINE (SILVADENE, SSD) 1 % cream To LLE achilles area wound.  Cleanse with NS, pat dry.  Apply layer of ointment to entire wound bed.  Cover with NS (lightly-moistened) gauze and secure with Kerlix. 50 g 0     triamcinolone (KENALOG) 0.5 % ointment Apply 1 application topically 2 (two) times a day.        warfarin (COUMADIN) 2 MG tablet Take by mouth daily. 7/17/17 INR 2.2. Take 2mg daily. Next INR 7/20       No current facility-administered medications for this visit.      No Known Allergies      Review of Systems:    Constitutional: Negative.  Negative for fever, chills, activity change, appetite change and fatigue.   HENT: Negative for congestion and facial swelling.    Eyes: Negative for photophobia, redness and visual disturbance.   Respiratory:  Negative for cough and chest tightness.    Cardiovascular: Negative for chest pain, palpitations and leg swelling.   Gastrointestinal: Negative for nausea, diarrhea, constipation, blood in stool and abdominal distention.   Genitourinary: Negative.    Musculoskeletal: Negative.  Generalized weakness with history of falls   Skin: Negative.    Neurological: Negative for dizziness, tremors, syncope, weakness, light-headedness and headaches.   Hematological: Does not bruise/bleed easily.   Psychiatric/Behavioral: Negative.      Vitals:    07/25/17 1053   BP: 167/82   Pulse: 63   Temp: 98  F (36.7  C)       Physical Exam:    GENERAL: no acute distress. Cooperative in conversation.   HEENT: pupils are equal, round and reactive. Oral mucosa is moist and intact.  RESP:Chest symmetric. Regular respiratory rate. No stridor.  CVS: S1S2;IRREGULAR  ABD: Nondistended, soft.  Perianal area shows significant redness  EXTREMITIES: 2+ lower extremity edema.  Chronic hyper pigmentary changes noted in his lower extremities with cold to touch. scabbing noted on multiple open areas both on his knees shins as well as in his hands  His left lower extremity has a large open area about 6 cm in size over his Achilles tendon with the tendon being exposed the margins are red.  NEURO: non focal. Alert and oriented x3.   PSYCH: within normal limits. No depression or anxiety.  SKIN: warm dry intact ; some ecchymosis noted on his right groin    Labs:    Us Venous Arm Right  Result Date: 7/15/2017  . CONCLUSION: 1.  The right arm veins are negative for DVT. Occlusive superficial thrombophlebitis right basilic vein extending from the elbow to the wrist. NOTE: ABNORMAL REPORT THE DICTATION ABOVE DESCRIBES AN ABNORMALITY FOR WHICH FOLLOW-UP IS NEEDED.     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  He tolerated the procedure well and will follow up with cardiology as an outpatient  Chronic lower extremity wounds with cultures showing Pseudomonas enterococcus and MRSA  Patient was on doxycycline and Levaquin on outpatient basis and seen by infectious disease  Repeat blood cultures are negative MRSA swab was negative he was given IV antibiotics prior to pacemaker placement in the hospital  But now has been discharged with topical wound care only.  Wound care were reviewed and no secondary concerns except for his ankle where he will see a wound clinic soon  End-stage renal disease on hemodialysis 3 days a week.  He was managed by nephrology in the hospital he underwent a fistulogram of his AV fistula with did not show any significant stenoses  Hypertension on Cozaar and Norvasc monitor blood pressures were elevated initially on admission  Anemia on chronic erythropoietin   secondary hyperparathyroidism on binders PhosLo and Renvela-dosage is adjusted by renal  Chronic A. fib monitor INRs;2.1 CONTINUE with his current Coumadin dose  History of peripheral vascular disease  History of gout  History of hypothyroidism on replacement Synthroid  BPPV-  Debilitation weakness with history of fall felt to be brought in by bradycardia here for PT OT and rehab  TUG 2.26; TINETTI 7/28 both indicating a high fall risk.UMS 25/30  Progress is limited by extreme fatigue that the patient experiences post dialysis  Total time spent was 35 minutes, more than half of it was in face-to-face counseling regarding disease state, treatment, side effects, documentation, review of clinical data and coordination of care  Electronically signed by: ANGELA Morales  This progress note was completed using Dragon software and there may be grammatical errors.

## 2021-06-12 NOTE — PROGRESS NOTES
[facility]      Code Status:  FULL CODE  Visit Type: Review Of Multiple Medical Conditions     Facility:  [facility] [[id number]]           History of Present Illness: Clayton Da Silva is a 76 y.o. male who is currently admitted to the TCU as a transfer from the hospital.  This is a 76-year-old with a known history of end-stage renal disease on hemodialysis 3 days a week.  He also has a history of nephrotic syndrome hypothyroidism, hypertension and A. 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  He presented to the hospital after a fall  As per him he was becoming progressively weak and was found slumped on the floor and was noted to have a heart rate of 15.  He was admitted with severe bradycardia and in the hospital was seen by cardiology and had transcutaneous pacing started.  Subsequently he was admitted and had a placement of micrA lead left pacemaker placement done on 7/13/17.  He tolerated the procedure well and will have an outpatient follow-up with cardiology  He continued with his hemodialysis and was seen by nephrology in the hospital..  In addition he has multiple wounds on his body from pruritus and falls including on the hands and knees and legs with a chronic right lower extremity wound with exposed Achilles tendon  Was given IV antibiotics in the hospital and seen by infectious disease recent wound cultures have grown Pseudomonas and enterococcus and methicillin sensitive Staphylococcus aureus on 6/29/17.  He was on oral doxycycline and Levaquin and blood cultures in the hospital were negative infectious disease saw him and recommended discontinuing all her oral antibiotics.  His MRSA swab was negative and he has been discharged with topical wound cares only  He has been discharged to the TCU  He went to dialysis yesterday and complaining of severe fatigue and tiredness appetite has been good no new other concerns    Past Medical History:   Diagnosis  Date     Atrial fibrillation 7/3/2007    Overview:  chronic     Benign paroxysmal positional vertigo 3/22/2010    Overview:  Off and on     ESRD (end stage renal disease) 6/28/2007    Overview:  On chronic hemodialysis.     Essential hypertension 6/28/2007     Gout 7/9/2017    Overview:  Polyarticular. Much better on treatment.     Hyperlipidemia 6/28/2007    Overview:  Diet controlled     Hypothyroidism 6/28/2007    Overview:  Borderline. No treatment given yet.     Long term current use of anticoagulant 10/12/2013     Nephrotic syndrome 1/11/2008    Overview:  24 hour urine protein 7979mg. Creatinine clearance 42. Kidney biopsy, focal/global glomerular sclerosis/arteriolar changes. ?Htn related. Evaluated by Nephrology (Dr Paulino).     Open wounds involving multiple regions of lower extremity 6/1/2017     Past Surgical History:   Procedure Laterality Date     LUE AVF       IN ICAR CATHETER ABLATION ATRIOVENTR NODE FUNCTION N/A 7/13/2017    Procedure: EP Leadless Pacemaker;  Surgeon: Leti Guerra MD;  Location: [facility] Lab;  Service: Cardiology     IN INSJ/RPLCMT TEMP TRANSVNS 1CHMBR ELTRD/PM CATH N/A 7/9/2017    Procedure: CV Temporary Pacemaker Insertion;  Surgeon: Radha Granger MD;  Location: [facility] Cath Lab;  Service: Cardiology     Family History   Problem Relation Age of Onset     Stroke Mother      Social History     Social History     Marital status:      Spouse name: N/A     Number of children: N/A     Years of education: N/A     Occupational History     Not on file.     Social History Main Topics     Smoking status: Never Smoker     Smokeless tobacco: Not on file     Alcohol use No     Drug use: No     Sexual activity: Not on file     Other Topics Concern     Not on file     Social History Narrative     Current Outpatient Prescriptions   Medication Sig Dispense Refill     acetaminophen (TYLENOL) 500 MG tablet Take 1-2 tablets (500-1,000 mg total) by mouth every 6 (six)  hours as needed.  0     amLODIPine (NORVASC) 10 MG tablet Take 10 mg by mouth daily.       B complex with C#20-folic acid (VIRT-CAPS) 1 mg cap capsule 1 capsule daily.        calcium acetate (PHOSLO) 667 mg capsule Take 667 mg by mouth 3 (three) times a day with meals. With each snack and if he has a third small meal       gentamicin (GARAMYCIN) 0.1 % ointment Apply 1 application topically daily. With each dressing change to multiple open wounds on lower extremities       HYDROcodone-acetaminophen 5-325 mg per tablet Take 1 tablet by mouth every 6 (six) hours as needed for pain. Pain/discomfort from multiple lower extremities wounds 20 tablet 0     hydrOXYzine (ATARAX) 10 MG tablet Take 10 mg by mouth every 8 (eight) hours as needed for itching.        levothyroxine (SYNTHROID, LEVOTHROID) 100 MCG tablet Take 100 mcg by mouth Daily at 6:00 am.        losartan (COZAAR) 100 MG tablet Take 100 mg by mouth daily.        sevelamer carbonate (RENVELA) 800 mg tablet Take 800 mg by mouth 2 (two) times a day with meals.        silver sulfADIAZINE (SILVADENE, SSD) 1 % cream To LLE achilles area wound.  Cleanse with NS, pat dry.  Apply layer of ointment to entire wound bed.  Cover with NS (lightly-moistened) gauze and secure with Kerlix. 50 g 0     triamcinolone (KENALOG) 0.5 % ointment Apply 1 application topically 2 (two) times a day.        warfarin (COUMADIN) 2 MG tablet Take by mouth daily. 7/17/17 INR 2.2. Take 2mg daily. Next INR 7/20       No current facility-administered medications for this visit.      No Known Allergies      Review of Systems:    Constitutional: Negative.  Negative for fever, chills, activity change, appetite change and fatigue.   HENT: Negative for congestion and facial swelling.    Eyes: Negative for photophobia, redness and visual disturbance.   Respiratory: Negative for cough and chest tightness.    Cardiovascular: Negative for chest pain, palpitations and leg swelling.   Gastrointestinal:  Negative for nausea, diarrhea, constipation, blood in stool and abdominal distention.   Genitourinary: Negative.    Musculoskeletal: Negative.  Generalized weakness with history of falls   Skin: Negative.    Neurological: Negative for dizziness, tremors, syncope, weakness, light-headedness and headaches.   Hematological: Does not bruise/bleed easily.   Psychiatric/Behavioral: Negative.      Vitals:    07/20/17 1218   BP: 156/85   Pulse: 66   Temp: 98  F (36.7  C)       Physical Exam:    GENERAL: no acute distress. Cooperative in conversation.   HEENT: pupils are equal, round and reactive. Oral mucosa is moist and intact.  RESP:Chest symmetric. Regular respiratory rate. No stridor.  CVS: S1S2;IRREGULAR  ABD: Nondistended, soft.  Perianal area shows significant redness  EXTREMITIES: 2+ lower extremity edema.  Chronic hyper pigmentary changes noted in his lower extremities with cold to touch. scabbing noted on multiple open areas both on his knees shins as well as in his hands  His left lower extremity has a large open area about 6 cm in size over his Achilles tendon with the tendon being exposed the margins are red.  NEURO: non focal. Alert and oriented x3.   PSYCH: within normal limits. No depression or anxiety.  SKIN: warm dry intact ; some ecchymosis noted on his right groin    Labs:    Us Venous Arm Right  Result Date: 7/15/2017  . CONCLUSION: 1.  The right arm veins are negative for DVT. Occlusive superficial thrombophlebitis right basilic vein extending from the elbow to the wrist. NOTE: ABNORMAL REPORT THE DICTATION ABOVE DESCRIBES AN ABNORMALITY FOR WHICH FOLLOW-UP IS NEEDED.     INR 2.1  Assessment/Plan:   Bradycardia severe/Valdez-Duran syncope with heart rates on 15 on admission  He was evaluated by cardiology and has had MICRA-placement which is a lead leSS pacemaker done on 7/13/17.  He tolerated the procedure well and will follow up with cardiology as an outpatient  Chronic lower extremity wounds with  cultures showing Pseudomonas enterococcus and MRSA  Patient was on doxycycline and Levaquin on outpatient basis and seen by infectious disease  Repeat blood cultures are negative MRSA swab was negative he was given IV antibiotics prior to pacemaker placement in the hospital  But now has been discharged with topical wound care only.  Wound care were reviewed and no secondary concerns except for his ankle where he will see a wound clinic soon  End-stage renal disease on hemodialysis 3 days a week.  He was managed by nephrology in the hospital he underwent a fistulogram of his AV fistula with did not show any significant stenoses  Hypertension on Cozaar and Norvasc monitor blood pressures were elevated initially on admission  Anemia on chronic erythropoietin   secondary hyperparathyroidism on binders PhosLo and Renvela  Chronic A. fib monitor INRs;2.1 TODAY tender with his current Coumadin dose  History of peripheral vascular disease  History of gout  History of hypothyroidism on replacement Synthroid  BPPV-  Debilitation weakness with history of fall felt to be brought in by bradycardia here for PT OT and rehab  TUG 2.26; TINETTI 7/28 both indicating a high fall risk.[facility] 25/30  Total time spent was 35 minutes, more than half of it was in face-to-face counseling regarding disease state, treatment, side effects, documentation, review of clinical data and coordination of care  Electronically signed by: ANGELA Morales  This progress note was completed using Dragon software and there may be grammatical errors.       Instructions (Optional): SRT Detail Level: Simple

## 2021-06-12 NOTE — PROGRESS NOTES
[facility]      Code Status:  FULL CODE  Visit Type: Review Of Multiple Medical Conditions     Facility:  [facility] [[id number]]           History of Present Illness: Clayton Da Silva is a 76 y.o. male who is currently admitted to the TCU as a transfer from the hospital.  This is a 76-year-old with a known history of end-stage renal disease on hemodialysis 3 days a week.  He also has a history of nephrotic syndrome hypothyroidism, hypertension and A. 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  He presented to the hospital after a fall  As per him he was becoming progressively weak and was found slumped on the floor and was noted to have a heart rate of 15.  He was admitted with severe bradycardia and in the hospital was seen by cardiology and had transcutaneous pacing started.  Subsequently he was admitted and had a placement of micrA lead left pacemaker placement done on 7/13/17.  He tolerated the procedure well and will have an outpatient follow-up with cardiology  He continued with his hemodialysis and was seen by nephrology in the hospital..  In addition he has multiple wounds on his body from pruritus and falls including on the hands and knees and legs with a chronic right lower extremity wound with exposed Achilles tendon  Was given IV antibiotics in the hospital and seen by infectious disease recent wound cultures have grown Pseudomonas and enterococcus and methicillin sensitive Staphylococcus aureus on 6/29/17.  He was on oral doxycycline and Levaquin and blood cultures in the hospital were negative infectious disease saw him and recommended discontinuing all her oral antibiotics.  His MRSA swab was negative and he has been discharged with topical wound cares only.  His most of the wounds are healing up nicely with scabs noted and no secondary infection his left ankle wound is stable  He has been discharged to the TCU  He went to dialysis yesterday and  complaining of severe fatigue and tiredness appetite has been good no new other concerns.no fever      Past Medical History:   Diagnosis Date     Atrial fibrillation 7/3/2007    Overview:  chronic     Benign paroxysmal positional vertigo 3/22/2010    Overview:  Off and on     ESRD (end stage renal disease) 6/28/2007    Overview:  On chronic hemodialysis.     Essential hypertension 6/28/2007     Gout 7/9/2017    Overview:  Polyarticular. Much better on treatment.     Hyperlipidemia 6/28/2007    Overview:  Diet controlled     Hypothyroidism 6/28/2007    Overview:  Borderline. No treatment given yet.     Long term current use of anticoagulant 10/12/2013     Nephrotic syndrome 1/11/2008    Overview:  24 hour urine protein 7979mg. Creatinine clearance 42. Kidney biopsy, focal/global glomerular sclerosis/arteriolar changes. ?Htn related. Evaluated by Nephrology (Dr Paulino).     Open wounds involving multiple regions of lower extremity 6/1/2017     Past Surgical History:   Procedure Laterality Date     LUE AVF       MS ICAR CATHETER ABLATION ATRIOVENTR NODE FUNCTION N/A 7/13/2017    Procedure: EP Leadless Pacemaker;  Surgeon: Leti Guerra MD;  Location: [facility];  Service: Cardiology     MS INSJ/RPLCMT TEMP TRANSVNS 1CHMBR ELTRD/PM CATH N/A 7/9/2017    Procedure: CV Temporary Pacemaker Insertion;  Surgeon: Radha Granger MD;  Location: [facility] Cath Lab;  Service: Cardiology     Family History   Problem Relation Age of Onset     Stroke Mother      Social History     Social History     Marital status:      Spouse name: N/A     Number of children: N/A     Years of education: N/A     Occupational History     Not on file.     Social History Main Topics     Smoking status: Never Smoker     Smokeless tobacco: Not on file     Alcohol use No     Drug use: No     Sexual activity: Not on file     Other Topics Concern     Not on file     Social History Narrative     Current Outpatient Prescriptions    Medication Sig Dispense Refill     acetaminophen (TYLENOL) 500 MG tablet Take 1-2 tablets (500-1,000 mg total) by mouth every 6 (six) hours as needed.  0     amLODIPine (NORVASC) 10 MG tablet Take 10 mg by mouth daily.       B complex with C#20-folic acid (VIRT-CAPS) 1 mg cap capsule 1 capsule daily.        calcium acetate (PHOSLO) 667 mg capsule Take 667 mg by mouth 3 (three) times a day with meals. With each snack and if he has a third small meal       gentamicin (GARAMYCIN) 0.1 % ointment Apply 1 application topically daily. With each dressing change to multiple open wounds on lower extremities       HYDROcodone-acetaminophen 5-325 mg per tablet Take 1 tablet by mouth every 6 (six) hours as needed for pain. Pain/discomfort from multiple lower extremities wounds 20 tablet 0     hydrOXYzine (ATARAX) 10 MG tablet Take 10 mg by mouth every 8 (eight) hours as needed for itching.        levothyroxine (SYNTHROID, LEVOTHROID) 100 MCG tablet Take 100 mcg by mouth Daily at 6:00 am.        losartan (COZAAR) 100 MG tablet Take 100 mg by mouth daily.        sevelamer carbonate (RENVELA) 800 mg tablet Take 800 mg by mouth 2 (two) times a day with meals.        silver sulfADIAZINE (SILVADENE, SSD) 1 % cream To LLE achilles area wound.  Cleanse with NS, pat dry.  Apply layer of ointment to entire wound bed.  Cover with NS (lightly-moistened) gauze and secure with Kerlix. 50 g 0     triamcinolone (KENALOG) 0.5 % ointment Apply 1 application topically 2 (two) times a day.        warfarin (COUMADIN) 2 MG tablet Take by mouth daily. 7/17/17 INR 2.2. Take 2mg daily. Next INR 7/20       No current facility-administered medications for this visit.      No Known Allergies      Review of Systems:    Constitutional: Negative.  Negative for fever, chills, activity change, appetite change and fatigue.   HENT: Negative for congestion and facial swelling.    Eyes: Negative for photophobia, redness and visual disturbance.   Respiratory:  Negative for cough and chest tightness.    Cardiovascular: Negative for chest pain, palpitations and leg swelling.   Gastrointestinal: Negative for nausea, diarrhea, constipation, blood in stool and abdominal distention.   Genitourinary: Negative.    Musculoskeletal: Negative.  Generalized weakness with history of falls   Skin: Negative.    Neurological: Negative for dizziness, tremors, syncope, weakness, light-headedness and headaches.   Hematological: Does not bruise/bleed easily.   Psychiatric/Behavioral: Negative.      Vitals:    07/27/17 1213   BP: 164/82   Pulse: (!) 56   Temp: 98  F (36.7  C)       Physical Exam:    GENERAL: no acute distress. Cooperative in conversation.   HEENT: pupils are equal, round and reactive. Oral mucosa is moist and intact.  RESP:Chest symmetric. Regular respiratory rate. No stridor.  CVS: S1S2;IRREGULAR  ABD: Nondistended, soft.  Perianal area shows significant redness  EXTREMITIES: 2+ lower extremity edema.  Chronic hyper pigmentary changes noted in his lower extremities with cold to touch. scabbing noted on multiple open areas both on his knees shins as well as in his hands  His left lower extremity has a large open area about 6 cm in size over his Achilles tendon with the tendon being exposed the margins are red.  NEURO: non focal. Alert and oriented x3.   PSYCH: within normal limits. No depression or anxiety.  SKIN: warm dry intact ; some ecchymosis noted on his right groin    Labs:    Us Venous Arm Right  Result Date: 7/15/2017  . CONCLUSION: 1.  The right arm veins are negative for DVT. Occlusive superficial thrombophlebitis right basilic vein extending from the elbow to the wrist. NOTE: ABNORMAL REPORT THE DICTATION ABOVE DESCRIBES AN ABNORMALITY FOR WHICH FOLLOW-UP IS NEEDED.     INR 2.1  Assessment/Plan:   Bradycardia severe/Valdez-Duran syncope with heart rates on 15 on admission  He was evaluated by cardiology and has had MICRA-placement which is a lead leSS pacemaker  done on 7/13/17.  He tolerated the procedure well and will follow up with cardiology as an outpatient  Chronic lower extremity wounds with cultures showing Pseudomonas enterococcus and MRSA  Patient was on doxycycline and Levaquin on outpatient basis and seen by infectious disease  Repeat blood cultures are negative MRSA swab was negative he was given IV antibiotics prior to pacemaker placement in the hospital  But now has been discharged with topical wound care only..  Wound care were reviewed and no secondary concerns except for his ankle where he will see a wound clinic soon  End-stage renal disease on hemodialysis 3 days a week.  He was managed by nephrology in the hospital he underwent a fistulogram of his AV fistula with did not show any significant stenoses  Hypertension on Cozaar and Norvasc monitor blood pressures were elevated initially on admission  Anemia on chronic erythropoietin   secondary hyperparathyroidism on binders PhosLo and Renvela-dosage is adjusted by renal  Chronic A. fib monitor his last INRs;2.1 CONTINUE with his current Coumadin dose  History of peripheral vascular disease  History of gout  History of hypothyroidism on replacement Synthroid  BPPV-  Debilitation weakness with history of fall felt to be brought in by bradycardia here for PT OT and rehab  TUG 2.26; TINETTI 7/28 both indicating a high fall risk.SL[facility]MS 25/30  Progress is limited by extreme fatigue that the patient experiences post dialysis  No new concerns  Electronically signed by: ANGELA Morales  This progress note was completed using Dragon software and there may be grammatical errors.

## 2021-06-12 NOTE — PROGRESS NOTES
[facility]    DATE: 2017    NAME:  Clayton Da Silva             :  1940    MRN: [id number]    CODE STATUS:  FULL CODE    VISIT TYPE: ACUTE  FACILITY: [facility] [[id number]]      CHIEF COMPLAIN/REASON FOR VISIT:  Chief Complaint   Patient presents with     Review Of Multiple Medical Conditions     HISTORY OF PRESENT ILLNESS: Clayton Da Silva is a 76 y.o. male being seen at the request of the nurses to review the right Achilles tendon ulcer during wound care changes. Clayton has a H/0 of end-stage renal disease on hemodialysis 3 days a week.  He also has a history of nephrotic syndrome hypothyroidism, hypertension and A. 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  He presented to the hospital after a a fall, and he reports was becoming progressively weak and was found slumped on the floor and was noted to have a heart rate of 15.  He was admitted with severe bradycardia and in the hospital was seen by cardiology and had transcutaneous pacing started.  Subsequently he was admitted and had a placement of micrA lead left pacemaker placement done on 17.  He tolerated the procedure well and will have an outpatient follow-up with cardiology.  His blood pressures have fluctuated however appears his current hypertensive medications are effective, current weight is 224.9.  Right Achilles wound was noted to be is 7.5 cm long and 4 cm across.  There is yellow slough noted in the bed of the wound.    No Known Allergies:     Current Outpatient Prescriptions   Medication Sig     acetaminophen (TYLENOL) 500 MG tablet Take 1-2 tablets (500-1,000 mg total) by mouth every 6 (six) hours as needed.     amLODIPine (NORVASC) 10 MG tablet Take 10 mg by mouth daily.     B complex with C#20-folic acid (VIRT-CAPS) 1 mg cap capsule 1 capsule daily.      calcium acetate (PHOSLO) 667 mg capsule Take 667 mg by mouth 3 (three) times a day with meals. With each snack and if he has a third  small meal     gentamicin (GARAMYCIN) 0.1 % ointment Apply 1 application topically daily. With each dressing change to multiple open wounds on lower extremities     HYDROcodone-acetaminophen 5-325 mg per tablet Take 1 tablet by mouth every 6 (six) hours as needed for pain. Pain/discomfort from multiple lower extremities wounds     hydrOXYzine (ATARAX) 10 MG tablet Take 10 mg by mouth every 8 (eight) hours as needed for itching.      levothyroxine (SYNTHROID, LEVOTHROID) 100 MCG tablet Take 100 mcg by mouth Daily at 6:00 am.      losartan (COZAAR) 100 MG tablet Take 100 mg by mouth daily.      sevelamer carbonate (RENVELA) 800 mg tablet Take 800 mg by mouth 2 (two) times a day with meals.      silver sulfADIAZINE (SILVADENE, SSD) 1 % cream To LLE achilles area wound.  Cleanse with NS, pat dry.  Apply layer of ointment to entire wound bed.  Cover with NS (lightly-moistened) gauze and secure with Kerlix.     triamcinolone (KENALOG) 0.5 % ointment Apply 1 application topically 2 (two) times a day.      warfarin (COUMADIN) 2 MG tablet Take by mouth daily. 7/17/17 INR 2.2. Take 2mg daily. Next INR 7/20         REVIEW OF SYSTEMS:    Currently, no fever, chills, or rigors. Does not have any visual or hearing problems. Denies any chest pain, headaches, palpitations, lightheadedness, dizziness, shortness of breath, or cough. Appetite is good. Denies any GERD symptoms. Denies any difficulty with swallowing, nausea, or vomiting.  Denies any abdominal pain, diarrhea or constipation. Denies any urinary symptoms. No insomnia. No active bleeding. No rash.  Reports pain is manageable being managed with medication regime.      PHYSICAL EXAMINATION:  Vitals:    08/01/17 2016   BP: (!) 163/94   Pulse: 65   Temp: 97.3  F (36.3  C)   Weight: (!) 224 lb 9.6 oz (101.9 kg)         GENERAL: Awake, Alert, oriented x3, not in any form of acute distress, answers questions appropriately, follows simple commands, conversant  HEENT: Head is  normocephalic with normal hair distribution. No evidence of trauma. Ears: No acute purulent discharge. Eyes: Conjunctivae pink with no scleral jaundice. Nose: Normal mucosa and septum. NECK: Supple with no cervical or supraclavicular lymphadenopathy. Trachea is midline.   CHEST: No tenderness or deformity, no crepitus  LUNG: Clear to auscultation with good chest expansion. There are no crackles or wheezes, normal AP diameter.  BACK: No kyphosis of the thoracic spine. Symmetric, no curvature, ROM normal, no CVA tenderness, no spinal tenderness   CVS: There is good S1  S2, there are no murmurs, rubs, gallops, or heaves, rhythm is regular,  2+ pulses symmetric in all extremities.  ABDOMEN: Globular and soft, nontender to palpation, non distended, no masses, no organomegaly, good bowel sounds, no rebound or guarding, no peritoneal signs.   EXTREMITIES: Atraumatic. Full range of motion on both upper and lower extremities, there is no tenderness to palpation, no pedal edema, no cyanosis or clubbing, no calf tenderness.  Pulses equal in all extremities, normal cap refill, no joint swelling.  SKIN: Warm and dry, no erythema noted.  Skin color, lower leg.  Legs are also very dry and flaky.  NEUROLOGICAL: The patient is oriented to person, place and time. Strength and sensation are grossly intact. Face is symmetric.                    LABS:    Lab Results   Component Value Date    WBC 3.0 (L) 07/16/2017    HGB 9.0 (L) 07/16/2017    HCT 26.9 (L) 07/16/2017    MCV 92 07/16/2017    PLT 83 (L) 07/16/2017       Results for orders placed or performed during the hospital encounter of 07/09/17   Basic Metabolic Panel   Result Value Ref Range    Sodium 140 136 - 145 mmol/L    Potassium 5.1 (H) 3.5 - 5.0 mmol/L    Chloride 100 98 - 107 mmol/L    CO2 24 22 - 31 mmol/L    Anion Gap, Calculation 16 5 - 18 mmol/L    Glucose 30 (LL) 70 - 125 mg/dL    Calcium 10.4 8.5 - 10.5 mg/dL    BUN 35 (H) 8 - 28 mg/dL    Creatinine 8.79 (HH) 0.70 -  1.30 mg/dL    GFR MDRD Af Amer 7 (L) >60 mL/min/1.73m2    GFR MDRD Non Af Amer 6 (L) >60 mL/min/1.73m2           No results found for: HGBA1C  No results found for: [id number]  No results found for: [id number]    ASSESSMENT/PLAN:  1. Open wounds involving multiple regions of lower extremity    2. Essential hypertension with goal blood pressure less than 140/90    3. Hyperlipidemia, unspecified hyperlipidemia type    4. Nephrotic syndrome      Review of wound reveals nonhealing right Achilles tendon with yellow slough center discussed with nurse manager there are pieces of the outer edges that appear macerated.  At this juncture would like to get Clayton back into the wound care clinic for recommendations on a treatment change.    I, Theresa Beck am scribing for and in the presence of, Dr. Cindy Boucher.   I Dr. Cindy Boucher, personally performed the services described in this documentation, as scribed by  Theresa Beck in my presence, and it is both accurate and complete.     Electronically Signed by:  Cindy Boucher

## 2021-06-16 PROBLEM — R53.1 GENERALIZED WEAKNESS: Status: ACTIVE | Noted: 2021-03-01

## 2021-06-16 PROBLEM — R00.1 BRADYCARDIA: Status: ACTIVE | Noted: 2017-07-09

## 2021-06-16 PROBLEM — M10.9 GOUT: Status: ACTIVE | Noted: 2017-07-09

## 2021-06-16 PROBLEM — T82.898S: Status: ACTIVE | Noted: 2021-03-04

## 2021-06-16 PROBLEM — R79.1 SUPRATHERAPEUTIC INR: Status: ACTIVE | Noted: 2017-07-09

## 2021-06-16 PROBLEM — S81.809A OPEN WOUNDS INVOLVING MULTIPLE REGIONS OF LOWER EXTREMITY: Status: ACTIVE | Noted: 2017-06-01

## 2021-06-16 PROBLEM — I50.22 CHRONIC SYSTOLIC HEART FAILURE (H): Status: ACTIVE | Noted: 2021-03-03

## 2021-06-16 PROBLEM — N19 RENAL FAILURE: Status: ACTIVE | Noted: 2021-03-04

## 2021-06-16 PROBLEM — G45.4 AMNESIA, GLOBAL, TRANSIENT: Status: ACTIVE | Noted: 2017-07-09

## 2021-06-16 PROBLEM — Z95.0 CARDIAC PACEMAKER IN SITU: Status: ACTIVE | Noted: 2017-08-15

## 2021-06-16 PROBLEM — E87.5 HYPERKALEMIA: Status: ACTIVE | Noted: 2017-07-09

## 2021-06-25 NOTE — PROGRESS NOTES
Progress Notes by Theresa Beck CNP at 2017  1:25 PM     Author: Theresa Beck CNP Service: -- Author Type: Nurse Practitioner    Filed: 2017  5:39 PM Encounter Date: 2017 Status: Attested    : Cindy Boucher MBBS (Physician) Cosigner: Cindy Boucher MBBS at 2017  5:52 PM    Attestation signed by Cindy Boucher MBBS at 2017  5:52 PM    Agree with plan of care                [facility]    DATE: 2017    NAME:  Clayton Da Silva             :  1940  MRN: [id number]  CODE STATUS:  FULL CODE    VISIT TYPE: DISCHARGE SUMMARY  FACILYTY: [facility] [[id number]]                    PRIMARY CARE PROVIDER: Mc Gray MD    DISCHARGE DIAGNOSIS:      1. Open wounds involving multiple regions of lower extremity    2. Severe Bradycardia -- S/P Leadless Pacemaker 17    3. Essential hypertension with goal blood pressure less than 140/90    4. ESRD -- Hemodialysis MWF         DISCHARGE MEDICATIONS:         Medication List          These changes are accurate as of: 17  1:52 PM.  If you have any questions, ask your nurse or doctor.               CONTINUE taking these medications          acetaminophen 500 MG tablet   Commonly known as:  TYLENOL   Take 1-2 tablets (500-1,000 mg total) by mouth every 6 (six) hours as needed.       amLODIPine 10 MG tablet   Commonly known as:  NORVASC       calcium acetate 667 mg capsule   Commonly known as:  PHOSLO       gentamicin 0.1 % ointment   Commonly known as:  GARAMYCIN       HYDROcodone-acetaminophen 5-325 mg per tablet   Take 1 tablet by mouth every 6 (six) hours as needed for pain. Pain/discomfort from multiple lower extremities wounds       hydrOXYzine 10 MG tablet   Commonly known as:  ATARAX       levothyroxine 100 MCG tablet   Commonly known as:  SYNTHROID, LEVOTHROID       losartan 100 MG tablet   Commonly known as:  COZAAR       sevelamer carbonate 800 mg tablet   Commonly known as:  RenVELa        silver sulfADIAZINE 1 % cream   Commonly known as:  SILVADENE, SSD   To LLE achilles area wound.  Cleanse with NS, pat dry.  Apply layer of ointment to entire wound bed.  Cover with NS (lightly-moistened) gauze and secure with Kerlix.       triamcinolone 0.5 % ointment   Commonly known as:  KENALOG       VIRT-CAPS 1 mg Cap capsule   Generic drug:  B complex with C#20-folic acid       warfarin 2 MG tablet   Commonly known as:  COUMADIN             HISTORY OF PRESENT ILLNESS: Clayton Da Silva is a 76 y.o. male who has been in the TCU unit at [facility] for rehab.  He came deconditioned and needing wound care.This is a 76-year-old with a known history of end-stage renal disease on hemodialysis 3 days a week.  He also has a history of nephrotic syndrome hypothyroidism, hypertension and A. 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 Prior to TCU he presented to the hospital after a fall.  He reports that he was becoming progressively weak and was found slumped on the floor and was noted to have a heart rate of 15.  He was admitted with severe bradycardia and in the hospital was seen by cardiology and had transcutaneous pacing started.  Subsequently he was admitted and had a placement of micrA lead left pacemaker placement done on 7/13/17.  He tolerated the procedure well and will have an outpatient follow-up with cardiology  He continued with his hemodialysis and was seen by nephrology in the hospital..In addition,  he has multiple wounds on his body from pruritus and falls including on the hands and knees and legs with a chronic right lower extremity wound with exposed Achilles tendon.  Blood pressures have been stable although a bit on the high side however he would like any medications changed by dialysis.  He was up in the dining room eating and was able to walk back to his room with a walker so we could discuss his upcoming discharge.    SKILLED NURSING FACILITY COURSE:  During this TCU stay, patient  completed all anticipated goals of therapy.      ROS: Clayton denies fever chills or unusual fatigue.  Denies shortness of breath or chest pains.  Denies nausea or vomiting constipation or urinary frequency    PHYSICAL EXAMINATION:    Vitals:    08/04/17 1348   BP: (!) 185/76   Pulse: 62   Temp: 97.4  F (36.3  C)   Weight: (!) 224 lb 12.8 oz (102 kg)         GENERAL: Awake, Alert, oriented x3, not in any form of acute distress, answers questions appropriately, follows simple commands, conversant  HEENT: Head is normocephalic with normal hair distribution. No evidence of trauma. Ears: No acute purulent discharge. Eyes: Conjunctivae pink with no scleral jaundice. Nose: Normal mucosa and septum. NECK: Supple with no cervical or supraclavicular lymphadenopathy. Trachea is midline.   CHEST: No tenderness or deformity, no crepitus  LUNG: Clear to auscultation with good chest expansion. There are no crackles or wheezes, normal AP diameter.  BACK: No kyphosis of the thoracic spine. Symmetric, no curvature, ROM normal, no CVA tenderness, no spinal tenderness   CVS: There is good S1  S2, there are no murmurs, rubs, gallops, or heaves, rhythm is regular,  2+ pulses symmetric in all extremities.  ABDOMEN: Globular and soft, nontender to palpation, non distended, no masses, no organomegaly, good bowel sounds, no rebound or guarding, no peritoneal signs.   EXTREMITIES: Atraumatic. Full range of motion on both upper and lower extremities, there is no tenderness to palpation, no pedal edema, no cyanosis or clubbing, no calf tenderness.  Pulses equal in all extremities, except for pedal pulses bilateral which are faint, normal cap refill, no joint swelling.  SKIN: Warm and dry, no erythema noted.  Skin color, texture,  Lesions to bilateral frontal legs, scabbed over areas from fall.  Also has right Achilles tendon exposed due to wound in that area  NEUROLOGICAL: The patient is oriented to person, place and time. Strength and sensation  are grossly intact. Face is symmetric.      LABS:  All labs reviewed in the nursing home record.        DISCHARGE PLAN: I certify that this patient is under Dr. Boucher's care, seen by the NP, and had a face-to-face encounter on August 4, 2017 that meets the physician face-to-face encounter requirements.  The encounter was in whole, or part related to the primary reason for home health.  The Patient is homebound due to: Decreased mobility, fatigue from dialysis, and debilitating wounds, the symptoms are taxing and it will take a considerable amount of effort for patient to leave the home.  He is dependent on others for transportation.  The patient is confined to her home and needs intermittent skilled nursing, PT,OT, RN  The patient has been under the care of Dr. Boucher/NP and   initiated the establishment of the plan of care.  He does have a follow-up wound care clinic on 8/8/2017 and he will continue dialysis Monday, Wednesday ,Fridays.       Patient to be followed by home care for physical therapy to eval and treat for strengthening, balance, endurance, and safety with mobility, and ambulation.  Patient to be followed by home care for occupational therapy to eval and treat for strengthening, ADL needs, adaptive equipment, and safety.  Patient to be followed by home care for nursing services for medication set up and teaching, symptom and disease processes monitoring, education and wound management.  PT INR monitoring last INR should have been 8/8/2017 with an acceptable dose listed on the med list.    Patient received a hard script for oxycodone 5/3 2 5 mg p.o. Every 6  hours as needed for pain      Engagement, and discharge coordination of care was greater than 35 minutes     I, Theresa Beck am scribing for and in the presence of, Dr. Cindy Boucher.   I Dr. Cindy Boucher, personally performed the services described in this documentation, as scribed by  Theresa Beck in my presence, and it is both accurate  and complete.     Electronically Signed by:  Cindy Boucher

## 2021-06-25 NOTE — PROGRESS NOTES
Progress Notes by Theresa Beck CNP at 8/3/2017  3:09 PM     Author: Theresa Beck CNP Service: -- Author Type: Nurse Practitioner    Filed: 2017  5:38 PM Encounter Date: 8/3/2017 Status: Attested    : Cindy Boucher MBBS (Physician) Cosigner: Cindy Boucher MBBS at 2017  5:52 PM    Attestation signed by Cindy Boucher MBBS at 2017  5:52 PM    Agree with plan of care                [facility]    DATE: 8/3/2017    NAME:  Clayton Da Silva             :  1940    MRN: [id number]    CODE STATUS:  FULL CODE    VISIT TYPE: ACUTE  FACILITY: [facility]SW[facility] ROOMING ACTIVITY QUESTION       ROOM: TCU 2    CHIEF COMPLAIN/REASON FOR VISIT:No chief complaint on file.      HISTORY OF PRESENT ILLNESS: Clayton Da Silva is a 76 y.o. male being seen for review of multisystems.  Patient is on the TCU unit attending therapy and just arrived from home visit. He attends  hemodialysis 3 days a week.  He also has a history of nephrotic syndrome hypothyroidism, hypertension and A. 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  He presented to the hospital after a fall. As per him he was becoming progressively weak and was found slumped on the floor and was noted to have a heart rate of 15.  He was admitted with severe bradycardia and in the hospital was seen by cardiology and had transcutaneous pacing started.  Subsequently he was admitted and had a placement of micrA lead left pacemaker placement done on 17.  Nurses report no changes in right Achilles wound, since last visit however we did set up a wound care clinic for next Tuesday.  We did discuss ongoing elevated blood pressures, however he would like to discuss with his renal doctor before he were to consider starting a hypertensive med.  We will follow with  to discuss discharge plans as he just had a home eval.  No Known Allergies:     Current Outpatient Prescriptions   Medication Sig   ? acetaminophen (TYLENOL) 500 MG  tablet Take 1-2 tablets (500-1,000 mg total) by mouth every 6 (six) hours as needed.   ? amLODIPine (NORVASC) 10 MG tablet Take 10 mg by mouth daily.   ? B complex with C#20-folic acid (VIRT-CAPS) 1 mg cap capsule 1 capsule daily.    ? calcium acetate (PHOSLO) 667 mg capsule Take 667 mg by mouth 3 (three) times a day with meals. With each snack and if he has a third small meal   ? gentamicin (GARAMYCIN) 0.1 % ointment Apply 1 application topically daily. With each dressing change to multiple open wounds on lower extremities   ? HYDROcodone-acetaminophen 5-325 mg per tablet Take 1 tablet by mouth every 6 (six) hours as needed for pain. Pain/discomfort from multiple lower extremities wounds   ? hydrOXYzine (ATARAX) 10 MG tablet Take 10 mg by mouth every 8 (eight) hours as needed for itching.    ? levothyroxine (SYNTHROID, LEVOTHROID) 100 MCG tablet Take 100 mcg by mouth Daily at 6:00 am.    ? losartan (COZAAR) 100 MG tablet Take 100 mg by mouth daily.    ? sevelamer carbonate (RENVELA) 800 mg tablet Take 800 mg by mouth 2 (two) times a day with meals.    ? silver sulfADIAZINE (SILVADENE, SSD) 1 % cream To LLE achilles area wound.  Cleanse with NS, pat dry.  Apply layer of ointment to entire wound bed.  Cover with NS (lightly-moistened) gauze and secure with Kerlix.   ? triamcinolone (KENALOG) 0.5 % ointment Apply 1 application topically 2 (two) times a day.    ? warfarin (COUMADIN) 2 MG tablet Take by mouth daily. 7/17/17 INR 2.2. Take 2mg daily. Next INR 7/20         REVIEW OF SYSTEMS:    Currently, no fever, chills, or rigors. Does not have any visual or hearing problems. Denies any chest pain, headaches, palpitations, lightheadedness, dizziness, shortness of breath, or cough. Appetite is good. Denies any GERD symptoms. Denies any difficulty with swallowing, nausea, or vomiting.  Denies any abdominal pain, diarrhea or constipation. Denies any urinary symptoms. No insomnia. No active bleeding. No rash.       PHYSICAL  EXAMINATION:  There were no vitals filed for this visit.      GENERAL: Awake, Alert, oriented x3, not in any form of acute distress, answers questions appropriately, follows simple commands, conversant  HEENT: Head is normocephalic with normal hair distribution. No evidence of trauma. Ears: No acute purulent discharge. Eyes: Conjunctivae pink with no scleral jaundice. Nose: Normal mucosa and septum. NECK: Supple with no cervical or supraclavicular lymphadenopathy. Trachea is midline.   CHEST: No tenderness or deformity, no crepitus  LUNG: Clear to auscultation with good chest expansion. There are no crackles or wheezes, normal AP diameter.  BACK: No kyphosis of the thoracic spine. Symmetric, no curvature, ROM normal, no CVA tenderness, no spinal tenderness   CVS: There is good S1  S2, there are no murmurs, rubs, gallops, or heaves, rhythm is regular,  2+ pulses symmetric in all extremities.  ABDOMEN: Globular and soft, nontender to palpation, non distended, no masses, no organomegaly, good bowel sounds, no rebound or guarding, no peritoneal signs.   EXTREMITIES: Atraumatic. Full range of motion on both upper and lower extremities, there is no tenderness to palpation, no pedal edema, no cyanosis or clubbing, no calf tenderness.  Pulses equal in all extremities, normal cap refill, no joint swelling.  SKIN: Warm and dry, no erythema noted.  Skin color, texture, open area to the right tendon is stressed today.  NEUROLOGICAL: The patient is oriented to person, place and time. Strength and sensation are grossly intact. Face is symmetric.                    LABS:    Lab Results   Component Value Date    WBC 3.0 (L) 07/16/2017    HGB 9.0 (L) 07/16/2017    HCT 26.9 (L) 07/16/2017    MCV 92 07/16/2017    PLT 83 (L) 07/16/2017       Results for orders placed or performed during the hospital encounter of 07/09/17   Basic Metabolic Panel   Result Value Ref Range    Sodium 140 136 - 145 mmol/L    Potassium 5.1 (H) 3.5 - 5.0  mmol/L    Chloride 100 98 - 107 mmol/L    CO2 24 22 - 31 mmol/L    Anion Gap, Calculation 16 5 - 18 mmol/L    Glucose 30 (LL) 70 - 125 mg/dL    Calcium 10.4 8.5 - 10.5 mg/dL    BUN 35 (H) 8 - 28 mg/dL    Creatinine 8.79 (HH) 0.70 - 1.30 mg/dL    GFR MDRD Af Amer 7 (L) >60 mL/min/1.73m2    GFR MDRD Non Af Amer 6 (L) >60 mL/min/1.73m2           No results found for: HGBA1C  No results found for: [id number]  No results found for: [id number]    ASSESSMENT/PLAN:  1. Chronic atrial fibrillation    2. Essential hypertension with goal blood pressure less than 140/90    3. ESRD -- Hemodialysis MWF            I, Theresa Beck am scribing for and in the presence of, Dr. Cindy Boucher.   I Dr. Cindy Boucher, personally performed the services described in this documentation, as scribed by  Theresa Beck in my presence, and it is both accurate and complete.     Electronically Signed by:  Cindy Boucher

## 2021-06-25 NOTE — PROGRESS NOTES
Progress Notes by Valerie De La Paz RN at 8/15/2017  4:11 PM     Author: Valerie De La Paz RN Service: -- Author Type: Registered Nurse    Filed: 8/17/2017  1:05 PM Encounter Date: 8/15/2017 Status: Signed    : Valerie De La Paz RN (Registered Nurse)       Type: Post-op device check.  Presenting Rhythm: Atrial fibrillation (AF) with intrinsic and appropriate ventricular paced response, rates 50s-100s bpm.  Battery status: 3.5 years remaining  Sensing/Pacing Thresholds: Stable sensing. Pacing threshold 3.75 V @ 0.24 ms, up from 2.75 V @ 0.24 ms at patient's next day check post-implant. Capture threshold repeated: 3.75 V @ 0.4 ms and 2.75 V @ 1.0 ms.  Arrhythmias: Permanent AF, no high rate detections.  Comments: Normal device function; programmed Capture Management OFF due to patient's variable rate and elevated thresholds. Reprogrammed Capture settings to maximum 5.0 V @ 1.0 ms, which bring estimated remaining battery longevity to 3.5 years. Patient is due for his three month post-operative check on 10/19/17. Dr. Cantu in Device Clinic and consulted. Routed to Dr. Guerra for review and recommendations. Patient remains on warfarin.  Valerie De La Paz RN, Clayton Schwarz   Male, 76 y.o., 1940  Weight:   (!) 224 lb 14.4 oz (102 kg)  Phone:   [phone]  PCP:   Mc Gray MD  [facility]  Long Term Care Patient  MRN:   [id number]  MyChart:   Pending  Next Appt:   10/19/2017    Message  Received: Yesterday       MD Valerie Crooks RN                     No changes.  Continue routine device checks.  Change outputs accordingly.  YK       Noted. MATTHEW